# Patient Record
Sex: MALE | Race: BLACK OR AFRICAN AMERICAN
[De-identification: names, ages, dates, MRNs, and addresses within clinical notes are randomized per-mention and may not be internally consistent; named-entity substitution may affect disease eponyms.]

---

## 2017-08-31 ENCOUNTER — HOSPITAL ENCOUNTER (OUTPATIENT)
Dept: HOSPITAL 53 - M LAB REF | Age: 57
End: 2017-08-31
Attending: FAMILY MEDICINE
Payer: COMMERCIAL

## 2017-08-31 DIAGNOSIS — E07.9: Primary | ICD-10-CM

## 2018-07-26 ENCOUNTER — HOSPITAL ENCOUNTER (OUTPATIENT)
Dept: HOSPITAL 53 - M LAB REF | Age: 58
End: 2018-07-26
Attending: FAMILY MEDICINE
Payer: COMMERCIAL

## 2018-07-26 DIAGNOSIS — E07.9: Primary | ICD-10-CM

## 2018-07-26 LAB — FREE T3: 3.1 PG/ML (ref 2.2–4)

## 2018-07-26 PROCEDURE — 84481 FREE ASSAY (FT-3): CPT

## 2018-09-09 ENCOUNTER — HOSPITAL ENCOUNTER (OUTPATIENT)
Dept: HOSPITAL 53 - M LAB | Age: 58
End: 2018-09-09
Attending: INTERNAL MEDICINE
Payer: COMMERCIAL

## 2018-09-09 DIAGNOSIS — E05.90: Primary | ICD-10-CM

## 2018-09-09 LAB
FREE T4: 0.69 NG/DL (ref 0.76–1.46)
THYROID STIMULATING HORMONE: 0.57 UIU/ML (ref 0.36–3.74)

## 2018-09-10 LAB — THYROGLOBULIN ANTIBODY: 23.2 U/ML (ref ?–60)

## 2018-09-12 LAB
THYROID STIMULATING IMMUNOGLOB: 1.47 IU/L (ref 0–0.55)
TSH RECEPTOR ASSAY: 1.98 IU/L (ref 0–1.75)

## 2018-10-16 ENCOUNTER — HOSPITAL ENCOUNTER (OUTPATIENT)
Dept: HOSPITAL 53 - M LAB REF | Age: 58
End: 2018-10-16
Attending: INTERNAL MEDICINE
Payer: COMMERCIAL

## 2018-10-16 DIAGNOSIS — Z79.4: ICD-10-CM

## 2018-10-16 DIAGNOSIS — E11.9: Primary | ICD-10-CM

## 2018-10-16 DIAGNOSIS — E05.90: ICD-10-CM

## 2018-10-16 LAB
EST. AVERAGE GLUCOSE BLD GHB EST-MCNC: 123 MG/DL (ref 60–110)
FREE T3: 2.9 PG/ML (ref 2.2–4)
FREE T4: 0.91 NG/DL (ref 0.76–1.46)
THYROID STIMULATING HORMONE: 0.24 UIU/ML (ref 0.36–3.74)

## 2019-01-24 ENCOUNTER — HOSPITAL ENCOUNTER (OUTPATIENT)
Dept: HOSPITAL 53 - M LAB | Age: 59
End: 2019-01-24
Attending: INTERNAL MEDICINE
Payer: COMMERCIAL

## 2019-01-24 DIAGNOSIS — E11.9: Primary | ICD-10-CM

## 2019-01-24 DIAGNOSIS — Z79.4: ICD-10-CM

## 2019-01-24 LAB
BUN SERPL-MCNC: 18 MG/DL (ref 7–18)
CALCIUM SERPL-MCNC: 9.5 MG/DL (ref 8.5–10.1)
CHLORIDE SERPL-SCNC: 100 MEQ/L (ref 98–107)
CHOLEST SERPL-MCNC: 233 MG/DL (ref ?–200)
CHOLEST/HDLC SERPL: 3.43 {RATIO} (ref ?–5)
CO2 SERPL-SCNC: 27 MEQ/L (ref 21–32)
CREAT SERPL-MCNC: 1.24 MG/DL (ref 0.7–1.3)
EST. AVERAGE GLUCOSE BLD GHB EST-MCNC: 131 MG/DL (ref 60–110)
GFR SERPL CREATININE-BSD FRML MDRD: > 60 ML/MIN/{1.73_M2} (ref 56–?)
GLUCOSE SERPL-MCNC: 103 MG/DL (ref 70–100)
HCT VFR BLD AUTO: 48.3 % (ref 42–52)
HDLC SERPL-MCNC: 68 MG/DL (ref 40–?)
HGB BLD-MCNC: 16.3 G/DL (ref 13.5–17.5)
LDLC SERPL CALC-MCNC: 139 MG/DL (ref ?–100)
MCH RBC QN AUTO: 29.2 PG (ref 27–33)
MCHC RBC AUTO-ENTMCNC: 33.7 G/DL (ref 32–36.5)
MCV RBC AUTO: 86.4 FL (ref 80–96)
NONHDLC SERPL-MCNC: 165 MG/DL
PLATELET # BLD AUTO: 270 10^3/UL (ref 150–450)
POTASSIUM SERPL-SCNC: 3.6 MEQ/L (ref 3.5–5.1)
RBC # BLD AUTO: 5.59 10^6/UL (ref 4.3–6.1)
SODIUM SERPL-SCNC: 137 MEQ/L (ref 136–145)
T4 FREE SERPL-MCNC: 0.98 NG/DL (ref 0.76–1.46)
TRIGL SERPL-MCNC: 131 MG/DL (ref ?–150)
TSH SERPL DL<=0.005 MIU/L-ACNC: 0.67 UIU/ML (ref 0.36–3.74)
WBC # BLD AUTO: 6.5 10^3/UL (ref 4–10)

## 2019-03-23 ENCOUNTER — HOSPITAL ENCOUNTER (EMERGENCY)
Dept: HOSPITAL 53 - M ED | Age: 59
LOS: 1 days | Discharge: HOME | End: 2019-03-24
Payer: COMMERCIAL

## 2019-03-23 VITALS — HEIGHT: 70 IN | BODY MASS INDEX: 31.13 KG/M2 | WEIGHT: 217.46 LBS

## 2019-03-23 DIAGNOSIS — E11.9: ICD-10-CM

## 2019-03-23 DIAGNOSIS — Z79.899: ICD-10-CM

## 2019-03-23 DIAGNOSIS — K92.2: Primary | ICD-10-CM

## 2019-03-23 DIAGNOSIS — K57.92: ICD-10-CM

## 2019-03-23 DIAGNOSIS — Z92.3: ICD-10-CM

## 2019-03-23 DIAGNOSIS — Z79.82: ICD-10-CM

## 2019-03-23 DIAGNOSIS — Z79.4: ICD-10-CM

## 2019-03-23 DIAGNOSIS — Z85.46: ICD-10-CM

## 2019-03-23 DIAGNOSIS — I10: ICD-10-CM

## 2019-03-23 LAB
ALBUMIN SERPL BCG-MCNC: 3.7 GM/DL (ref 3.2–5.2)
ALT SERPL W P-5'-P-CCNC: 31 U/L (ref 12–78)
APTT BLD: 27.1 SECONDS (ref 25.4–37.6)
BASOPHILS # BLD AUTO: 0 10^3/UL (ref 0–0.2)
BASOPHILS NFR BLD AUTO: 0.2 % (ref 0–1)
BILIRUB CONJ SERPL-MCNC: 0.2 MG/DL (ref 0–0.2)
BILIRUB SERPL-MCNC: 0.6 MG/DL (ref 0.2–1)
BUN SERPL-MCNC: 14 MG/DL (ref 7–18)
CALCIUM SERPL-MCNC: 9.2 MG/DL (ref 8.5–10.1)
CHLORIDE SERPL-SCNC: 104 MEQ/L (ref 98–107)
CO2 SERPL-SCNC: 25 MEQ/L (ref 21–32)
CREAT SERPL-MCNC: 1.12 MG/DL (ref 0.7–1.3)
EOSINOPHIL # BLD AUTO: 0 10^3/UL (ref 0–0.5)
EOSINOPHIL NFR BLD AUTO: 0.5 % (ref 0–3)
GFR SERPL CREATININE-BSD FRML MDRD: > 60 ML/MIN/{1.73_M2} (ref 56–?)
GLUCOSE SERPL-MCNC: 117 MG/DL (ref 70–100)
HCT VFR BLD AUTO: 43.1 % (ref 42–52)
HGB BLD-MCNC: 14.4 G/DL (ref 13.5–17.5)
INR PPP: 1.08
LIPASE SERPL-CCNC: 80 U/L (ref 73–393)
LYMPHOCYTES # BLD AUTO: 1.3 10^3/UL (ref 1.5–4.5)
LYMPHOCYTES NFR BLD AUTO: 14.3 % (ref 24–44)
MCH RBC QN AUTO: 28.5 PG (ref 27–33)
MCHC RBC AUTO-ENTMCNC: 33.4 G/DL (ref 32–36.5)
MCV RBC AUTO: 85.3 FL (ref 80–96)
MONOCYTES # BLD AUTO: 0.9 10^3/UL (ref 0–0.8)
MONOCYTES NFR BLD AUTO: 10.5 % (ref 0–5)
NEUTROPHILS # BLD AUTO: 6.5 10^3/UL (ref 1.8–7.7)
NEUTROPHILS NFR BLD AUTO: 73.6 % (ref 36–66)
PLATELET # BLD AUTO: 275 10^3/UL (ref 150–450)
POTASSIUM SERPL-SCNC: 4 MEQ/L (ref 3.5–5.1)
PROT SERPL-MCNC: 7.5 GM/DL (ref 6.4–8.2)
PROTHROMBIN TIME: 14.1 SECONDS (ref 12.1–14.4)
RBC # BLD AUTO: 5.05 10^6/UL (ref 4.3–6.1)
SODIUM SERPL-SCNC: 138 MEQ/L (ref 136–145)
WBC # BLD AUTO: 8.9 10^3/UL (ref 4–10)

## 2019-03-23 PROCEDURE — 96361 HYDRATE IV INFUSION ADD-ON: CPT

## 2019-03-23 PROCEDURE — 83605 ASSAY OF LACTIC ACID: CPT

## 2019-03-23 PROCEDURE — 81001 URINALYSIS AUTO W/SCOPE: CPT

## 2019-03-23 PROCEDURE — 93041 RHYTHM ECG TRACING: CPT

## 2019-03-23 PROCEDURE — 80048 BASIC METABOLIC PNL TOTAL CA: CPT

## 2019-03-23 PROCEDURE — 74177 CT ABD & PELVIS W/CONTRAST: CPT

## 2019-03-23 PROCEDURE — 85025 COMPLETE CBC W/AUTO DIFF WBC: CPT

## 2019-03-23 PROCEDURE — 87040 BLOOD CULTURE FOR BACTERIA: CPT

## 2019-03-23 PROCEDURE — 80076 HEPATIC FUNCTION PANEL: CPT

## 2019-03-23 PROCEDURE — 85730 THROMBOPLASTIN TIME PARTIAL: CPT

## 2019-03-23 PROCEDURE — 85610 PROTHROMBIN TIME: CPT

## 2019-03-23 PROCEDURE — 99285 EMERGENCY DEPT VISIT HI MDM: CPT

## 2019-03-23 PROCEDURE — 96360 HYDRATION IV INFUSION INIT: CPT

## 2019-03-23 PROCEDURE — 83690 ASSAY OF LIPASE: CPT

## 2019-03-23 NOTE — REPVR
EXAM: 

 CT Abdomen and Pelvis With Contrast 



EXAM DATE/TIME: 

 3/23/2019 11:01 PM 



CLINICAL HISTORY: 

 58 years old, male; Pain; Abdominal pain; Localized; Left lower quadrant 

(llq); Additional info: Llq pain, lower gi bleed 



TECHNIQUE: 

 Imaging protocol: Axial computed tomography images of the abdomen and pelvis 

with intravenous contrast. 

  Coronal and sagittal reformatted images were created and reviewed. 

 Radiation optimization: All CT scans at this facility use at least one of 

these dose optimization techniques: automated exposure control; mA and/or kV 

adjustment per patient size (includes targeted exams where dose is matched to 

clinical indication); or iterative reconstruction. 

 Contrast material: ISOVUE 370 

 Contrast volume: 100 ml 

 Contrast route: IV 



COMPARISON: 

 CT ABD PELVIS WITH CONTRAST 2/16/2016 1:19 AM 



FINDINGS: 

 Lower thorax: No acute findings. 



ABDOMEN: 

 Liver:  There is a diffuse decrease in hepatic parenchymal density, consistent 

with fatty infiltration. 

 Gallbladder and bile ducts: Normal. No calcified stones. No ductal dilation. 

 Pancreas: Normal. No ductal dilation. 

 Spleen: Normal. No splenomegaly. 

 Adrenals: Normal. No mass. 

 Kidneys and ureters: Normal. No hydronephrosis. 

 Stomach and bowel:  Mild diverticulosis is present in the distal colon. No 

diverticulitis. 

 Appendix: No evidence of appendicitis. 



PELVIS: 

 Bladder:  Mild thickening of the bladder wall likely related to changes of 

chronic bladder outlet obstruction. Clinical correlation to exclude cystitis 

suggested. 

 Reproductive: Unremarkable as visualized. 



ABDOMEN and PELVIS: 

 Intraperitoneal space: Normal. No free air. No significant fluid collection. 

 Bones/joints:  Mild central spinal stenosis at L4-5. Bulging annulus at L5-S1. 

 Soft tissues:  Small periumbilical hernia. Small umbilical hernia. 

 Vasculature:  The aorta demonstrates mild atherosclerotic calcification. 

 Lymph nodes: Normal. No enlarged lymph nodes. 



IMPRESSION: 

1. There is a diffuse decrease in hepatic parenchymal density, consistent with 

fatty infiltration. 

2. Mild thickening of the bladder wall likely related to changes of chronic 

bladder outlet obstruction. Clinical correlation to exclude cystitis suggested. 

3. Mild diverticulosis is present in the distal colon. No diverticulitis. 



Electronically signed by: Parth Miguel On 03/23/2019  23:53:03 PM

## 2019-03-24 VITALS — SYSTOLIC BLOOD PRESSURE: 173 MMHG | DIASTOLIC BLOOD PRESSURE: 83 MMHG

## 2019-04-16 ENCOUNTER — HOSPITAL ENCOUNTER (OUTPATIENT)
Dept: HOSPITAL 53 - M LAB | Age: 59
End: 2019-04-16
Attending: INTERNAL MEDICINE
Payer: COMMERCIAL

## 2019-04-16 DIAGNOSIS — E11.9: Primary | ICD-10-CM

## 2019-04-16 DIAGNOSIS — Z79.4: ICD-10-CM

## 2019-04-16 LAB
BUN SERPL-MCNC: 15 MG/DL (ref 7–18)
CALCIUM SERPL-MCNC: 9.2 MG/DL (ref 8.5–10.1)
CHLORIDE SERPL-SCNC: 102 MEQ/L (ref 98–107)
CHOLEST SERPL-MCNC: 234 MG/DL (ref ?–200)
CHOLEST/HDLC SERPL: 4.11 {RATIO} (ref ?–5)
CO2 SERPL-SCNC: 28 MEQ/L (ref 21–32)
CREAT SERPL-MCNC: 1.2 MG/DL (ref 0.7–1.3)
EST. AVERAGE GLUCOSE BLD GHB EST-MCNC: 166 MG/DL (ref 60–110)
GFR SERPL CREATININE-BSD FRML MDRD: > 60 ML/MIN/{1.73_M2} (ref 56–?)
GLUCOSE SERPL-MCNC: 126 MG/DL (ref 70–100)
HCT VFR BLD AUTO: 44.2 % (ref 42–52)
HDLC SERPL-MCNC: 57 MG/DL (ref 40–?)
HGB BLD-MCNC: 14.8 G/DL (ref 13.5–17.5)
LDLC SERPL CALC-MCNC: 146 MG/DL (ref ?–100)
MCH RBC QN AUTO: 28.2 PG (ref 27–33)
MCHC RBC AUTO-ENTMCNC: 33.5 G/DL (ref 32–36.5)
MCV RBC AUTO: 84.2 FL (ref 80–96)
NONHDLC SERPL-MCNC: 177 MG/DL
PLATELET # BLD AUTO: 318 10^3/UL (ref 150–450)
POTASSIUM SERPL-SCNC: 3.4 MEQ/L (ref 3.5–5.1)
RBC # BLD AUTO: 5.25 10^6/UL (ref 4.3–6.1)
SODIUM SERPL-SCNC: 137 MEQ/L (ref 136–145)
T4 FREE SERPL-MCNC: 1.23 NG/DL (ref 0.76–1.46)
TRIGL SERPL-MCNC: 155 MG/DL (ref ?–150)
TSH SERPL DL<=0.005 MIU/L-ACNC: 0.16 UIU/ML (ref 0.36–3.74)
WBC # BLD AUTO: 6.7 10^3/UL (ref 4–10)

## 2019-09-15 ENCOUNTER — HOSPITAL ENCOUNTER (OUTPATIENT)
Dept: HOSPITAL 53 - M LAB | Age: 59
End: 2019-09-15
Attending: PHYSICIAN ASSISTANT
Payer: COMMERCIAL

## 2019-09-15 DIAGNOSIS — E11.9: Primary | ICD-10-CM

## 2019-09-15 LAB
BUN SERPL-MCNC: 13 MG/DL (ref 7–18)
CALCIUM SERPL-MCNC: 9.5 MG/DL (ref 8.5–10.1)
CHLORIDE SERPL-SCNC: 100 MEQ/L (ref 98–107)
CHOLEST SERPL-MCNC: 236 MG/DL (ref ?–200)
CHOLEST/HDLC SERPL: 3.69 {RATIO} (ref ?–5)
CO2 SERPL-SCNC: 26 MEQ/L (ref 21–32)
CREAT SERPL-MCNC: 1.38 MG/DL (ref 0.7–1.3)
CREAT UR-MCNC: 380 MG/DL
EST. AVERAGE GLUCOSE BLD GHB EST-MCNC: 148 MG/DL (ref 60–110)
GFR SERPL CREATININE-BSD FRML MDRD: > 60 ML/MIN/{1.73_M2} (ref 56–?)
GLUCOSE SERPL-MCNC: 132 MG/DL (ref 70–100)
HDLC SERPL-MCNC: 64 MG/DL (ref 40–?)
LDLC SERPL CALC-MCNC: 133 MG/DL (ref ?–100)
MICROALBUMIN UR-MCNC: 228 MG/L
MICROALBUMIN/CREAT UR: 60 MCG/MG (ref 0–30)
NONHDLC SERPL-MCNC: 172 MG/DL
POTASSIUM SERPL-SCNC: 3.8 MEQ/L (ref 3.5–5.1)
SODIUM SERPL-SCNC: 137 MEQ/L (ref 136–145)
T3FREE SERPL-MCNC: 2.6 PG/ML (ref 2.2–4)
T4 FREE SERPL-MCNC: 0.92 NG/DL (ref 0.76–1.46)
TRIGL SERPL-MCNC: 197 MG/DL (ref ?–150)
TSH SERPL DL<=0.005 MIU/L-ACNC: 1.17 UIU/ML (ref 0.36–3.74)

## 2019-12-16 ENCOUNTER — HOSPITAL ENCOUNTER (OUTPATIENT)
Dept: HOSPITAL 53 - M LAB | Age: 59
End: 2019-12-16
Attending: PHYSICIAN ASSISTANT
Payer: COMMERCIAL

## 2019-12-16 DIAGNOSIS — E11.9: ICD-10-CM

## 2019-12-16 DIAGNOSIS — E05.90: Primary | ICD-10-CM

## 2019-12-16 DIAGNOSIS — Z79.4: ICD-10-CM

## 2019-12-16 LAB
BUN SERPL-MCNC: 10 MG/DL (ref 7–18)
CALCIUM SERPL-MCNC: 9.3 MG/DL (ref 8.5–10.1)
CHLORIDE SERPL-SCNC: 106 MEQ/L (ref 98–107)
CHOLEST SERPL-MCNC: 155 MG/DL (ref ?–200)
CHOLEST/HDLC SERPL: 1.82 {RATIO} (ref ?–5)
CO2 SERPL-SCNC: 23 MEQ/L (ref 21–32)
CREAT SERPL-MCNC: 1.04 MG/DL (ref 0.7–1.3)
CREAT UR-MCNC: 283 MG/DL
EST. AVERAGE GLUCOSE BLD GHB EST-MCNC: 120 MG/DL (ref 60–110)
GFR SERPL CREATININE-BSD FRML MDRD: > 60 ML/MIN/{1.73_M2} (ref 56–?)
GLUCOSE SERPL-MCNC: 107 MG/DL (ref 70–100)
HDLC SERPL-MCNC: 85 MG/DL (ref 40–?)
LDLC SERPL CALC-MCNC: 52 MG/DL (ref ?–100)
MICROALBUMIN UR-MCNC: 309 MG/L
MICROALBUMIN/CREAT UR: 109.1 MCG/MG (ref 0–30)
NONHDLC SERPL-MCNC: 70 MG/DL
POTASSIUM SERPL-SCNC: 4 MEQ/L (ref 3.5–5.1)
SODIUM SERPL-SCNC: 138 MEQ/L (ref 136–145)
T3FREE SERPL-MCNC: 2.8 PG/ML (ref 2.2–4)
T4 FREE SERPL-MCNC: 1.11 NG/DL (ref 0.76–1.46)
TRIGL SERPL-MCNC: 90 MG/DL (ref ?–150)
TSH SERPL DL<=0.005 MIU/L-ACNC: 0.08 UIU/ML (ref 0.36–3.74)

## 2020-04-21 ENCOUNTER — HOSPITAL ENCOUNTER (OUTPATIENT)
Dept: HOSPITAL 53 - M LAB | Age: 60
End: 2020-04-21
Attending: PHYSICIAN ASSISTANT
Payer: COMMERCIAL

## 2020-04-21 DIAGNOSIS — E11.9: Primary | ICD-10-CM

## 2020-04-21 DIAGNOSIS — E05.90: ICD-10-CM

## 2020-04-21 DIAGNOSIS — Z79.4: ICD-10-CM

## 2020-04-21 LAB
BUN SERPL-MCNC: 9 MG/DL (ref 7–18)
CALCIUM SERPL-MCNC: 9.2 MG/DL (ref 8.5–10.1)
CHLORIDE SERPL-SCNC: 104 MEQ/L (ref 98–107)
CO2 SERPL-SCNC: 25 MEQ/L (ref 21–32)
CREAT SERPL-MCNC: 1.2 MG/DL (ref 0.7–1.3)
CREAT UR-MCNC: 499 MG/DL
GFR SERPL CREATININE-BSD FRML MDRD: > 60 ML/MIN/{1.73_M2} (ref 56–?)
GLUCOSE SERPL-MCNC: 141 MG/DL (ref 70–100)
MICROALBUMIN UR-MCNC: 451 MG/L
MICROALBUMIN/CREAT UR: 90.3 MCG/MG (ref 0–30)
POTASSIUM SERPL-SCNC: 4.2 MEQ/L (ref 3.5–5.1)
SODIUM SERPL-SCNC: 135 MEQ/L (ref 136–145)
T3FREE SERPL-MCNC: 3.2 PG/ML (ref 2.2–4)
T4 FREE SERPL-MCNC: 1.07 NG/DL (ref 0.76–1.46)
TSH SERPL DL<=0.005 MIU/L-ACNC: 1 UIU/ML (ref 0.36–3.74)

## 2020-04-22 ENCOUNTER — HOSPITAL ENCOUNTER (OUTPATIENT)
Dept: HOSPITAL 53 - M LAB | Age: 60
End: 2020-04-22
Attending: INTERNAL MEDICINE
Payer: COMMERCIAL

## 2020-04-22 DIAGNOSIS — E11.9: Primary | ICD-10-CM

## 2020-04-22 LAB — EST. AVERAGE GLUCOSE BLD GHB EST-MCNC: 151 MG/DL (ref 60–110)

## 2020-05-25 ENCOUNTER — HOSPITAL ENCOUNTER (OUTPATIENT)
Dept: HOSPITAL 53 - M LAB | Age: 60
End: 2020-05-25
Attending: RADIOLOGY
Payer: COMMERCIAL

## 2020-05-25 DIAGNOSIS — C61: Primary | ICD-10-CM

## 2020-07-29 ENCOUNTER — HOSPITAL ENCOUNTER (OUTPATIENT)
Dept: HOSPITAL 53 - M LAB | Age: 60
End: 2020-07-29
Attending: INTERNAL MEDICINE
Payer: COMMERCIAL

## 2020-07-29 DIAGNOSIS — E11.9: Primary | ICD-10-CM

## 2020-07-29 DIAGNOSIS — E05.90: ICD-10-CM

## 2020-07-29 DIAGNOSIS — Z79.4: ICD-10-CM

## 2020-09-06 LAB
BUN SERPL-MCNC: 12 MG/DL (ref 7–18)
CALCIUM SERPL-MCNC: 9.2 MG/DL (ref 8.5–10.1)
CHLORIDE SERPL-SCNC: 100 MEQ/L (ref 98–107)
CHOLEST SERPL-MCNC: 171 MG/DL (ref ?–200)
CHOLEST/HDLC SERPL: 2.34 {RATIO} (ref ?–5)
CO2 SERPL-SCNC: 27 MEQ/L (ref 21–32)
CREAT SERPL-MCNC: 1.24 MG/DL (ref 0.7–1.3)
CREAT UR-MCNC: (no result) MG/DL
EST. AVERAGE GLUCOSE BLD GHB EST-MCNC: 143 MG/DL (ref 60–110)
GFR SERPL CREATININE-BSD FRML MDRD: > 60 ML/MIN/{1.73_M2} (ref 56–?)
GLUCOSE SERPL-MCNC: 129 MG/DL (ref 70–100)
HDLC SERPL-MCNC: 73 MG/DL (ref 40–?)
LDLC SERPL CALC-MCNC: 70 MG/DL (ref ?–100)
MICROALBUMIN UR-MCNC: 86.5 MG/L
MICROALBUMIN/CREAT UR: (no result) MCG/MG (ref 0–30)
NONHDLC SERPL-MCNC: 98 MG/DL
POTASSIUM SERPL-SCNC: 4 MEQ/L (ref 3.5–5.1)
SODIUM SERPL-SCNC: 135 MEQ/L (ref 136–145)
T4 FREE SERPL-MCNC: 1.01 NG/DL (ref 0.76–1.46)
TRIGL SERPL-MCNC: 139 MG/DL (ref ?–150)
TSH SERPL DL<=0.005 MIU/L-ACNC: 0.86 UIU/ML (ref 0.36–3.74)

## 2021-01-29 ENCOUNTER — HOSPITAL ENCOUNTER (OUTPATIENT)
Dept: HOSPITAL 53 - M LAB | Age: 61
End: 2021-01-29
Attending: INTERNAL MEDICINE
Payer: COMMERCIAL

## 2021-01-29 DIAGNOSIS — Z79.4: ICD-10-CM

## 2021-01-29 DIAGNOSIS — E11.9: Primary | ICD-10-CM

## 2021-01-29 LAB
CHOLEST SERPL-MCNC: 148 MG/DL (ref ?–200)
CHOLEST/HDLC SERPL: 2.06 {RATIO} (ref ?–5)
CREAT UR-MCNC: 132 MG/DL
EST. AVERAGE GLUCOSE BLD GHB EST-MCNC: 140 MG/DL (ref 60–110)
HDLC SERPL-MCNC: 72 MG/DL (ref 40–?)
LDLC SERPL CALC-MCNC: 53 MG/DL (ref ?–100)
MICROALBUMIN UR-MCNC: 20.1 MG/L
MICROALBUMIN/CREAT UR: 15.2 MCG/MG (ref 0–30)
NONHDLC SERPL-MCNC: 76 MG/DL
T4 FREE SERPL-MCNC: 1.03 NG/DL (ref 0.76–1.46)
TRIGL SERPL-MCNC: 117 MG/DL (ref ?–150)
TSH SERPL DL<=0.005 MIU/L-ACNC: 1.41 UIU/ML (ref 0.36–3.74)

## 2021-03-31 ENCOUNTER — HOSPITAL ENCOUNTER (OUTPATIENT)
Dept: HOSPITAL 53 - M LAB | Age: 61
End: 2021-03-31
Attending: PHYSICIAN ASSISTANT
Payer: COMMERCIAL

## 2021-03-31 DIAGNOSIS — E78.00: ICD-10-CM

## 2021-03-31 DIAGNOSIS — I10: ICD-10-CM

## 2021-03-31 DIAGNOSIS — Z79.4: ICD-10-CM

## 2021-03-31 DIAGNOSIS — C61: ICD-10-CM

## 2021-03-31 DIAGNOSIS — E05.90: ICD-10-CM

## 2021-03-31 DIAGNOSIS — E11.9: Primary | ICD-10-CM

## 2021-03-31 LAB
BUN SERPL-MCNC: 10 MG/DL (ref 7–18)
CALCIUM SERPL-MCNC: 9.2 MG/DL (ref 8.8–10.2)
CHLORIDE SERPL-SCNC: 101 MEQ/L (ref 98–107)
CO2 SERPL-SCNC: 27 MEQ/L (ref 21–32)
CREAT SERPL-MCNC: 1.15 MG/DL (ref 0.7–1.3)
CREAT UR-MCNC: 110 MG/DL
GFR SERPL CREATININE-BSD FRML MDRD: > 60 ML/MIN/{1.73_M2} (ref 49–?)
GLUCOSE SERPL-MCNC: 132 MG/DL (ref 70–100)
MICROALBUMIN UR-MCNC: 26 MG/L
MICROALBUMIN/CREAT UR: 23.6 MCG/MG (ref 0–30)
POTASSIUM SERPL-SCNC: 3.8 MEQ/L (ref 3.5–5.1)
SODIUM SERPL-SCNC: 135 MEQ/L (ref 136–145)
T3FREE SERPL-MCNC: 2.6 PG/ML (ref 2.2–4)
T4 FREE SERPL-MCNC: 0.97 NG/DL (ref 0.76–1.46)
TSH SERPL DL<=0.005 MIU/L-ACNC: 1.74 UIU/ML (ref 0.36–3.74)

## 2021-03-31 PROCEDURE — 82043 UR ALBUMIN QUANTITATIVE: CPT

## 2021-03-31 PROCEDURE — 84481 FREE ASSAY (FT-3): CPT

## 2021-03-31 PROCEDURE — 36415 COLL VENOUS BLD VENIPUNCTURE: CPT

## 2021-03-31 PROCEDURE — 84439 ASSAY OF FREE THYROXINE: CPT

## 2021-03-31 PROCEDURE — 84443 ASSAY THYROID STIM HORMONE: CPT

## 2021-03-31 PROCEDURE — 80048 BASIC METABOLIC PNL TOTAL CA: CPT

## 2021-06-30 ENCOUNTER — HOSPITAL ENCOUNTER (OUTPATIENT)
Dept: HOSPITAL 53 - M LAB REF | Age: 61
End: 2021-06-30
Attending: OPHTHALMOLOGY
Payer: COMMERCIAL

## 2021-06-30 DIAGNOSIS — D23.10: Primary | ICD-10-CM

## 2021-07-12 ENCOUNTER — HOSPITAL ENCOUNTER (OUTPATIENT)
Dept: HOSPITAL 53 - M LAB | Age: 61
End: 2021-07-12
Attending: NURSE PRACTITIONER

## 2021-07-12 DIAGNOSIS — Z00.00: Primary | ICD-10-CM

## 2021-10-05 ENCOUNTER — HOSPITAL ENCOUNTER (OUTPATIENT)
Dept: HOSPITAL 53 - M LAB | Age: 61
End: 2021-10-05
Attending: PHYSICIAN ASSISTANT
Payer: COMMERCIAL

## 2021-10-05 DIAGNOSIS — Z79.4: ICD-10-CM

## 2021-10-05 DIAGNOSIS — E11.9: Primary | ICD-10-CM

## 2021-10-05 DIAGNOSIS — E05.90: ICD-10-CM

## 2021-10-05 LAB
CHOLEST SERPL-MCNC: 166 MG/DL (ref ?–200)
CHOLEST/HDLC SERPL: 2.4 {RATIO} (ref ?–5)
EST. AVERAGE GLUCOSE BLD GHB EST-MCNC: 146 MG/DL (ref 60–110)
HDLC SERPL-MCNC: 69 MG/DL (ref 40–?)
LDLC SERPL CALC-MCNC: 76 MG/DL (ref ?–100)
NONHDLC SERPL-MCNC: 97 MG/DL
T4 FREE SERPL-MCNC: 1.07 NG/DL (ref 0.76–1.46)
TRIGL SERPL-MCNC: 107 MG/DL (ref ?–150)
TSH SERPL DL<=0.005 MIU/L-ACNC: 1.3 UIU/ML (ref 0.36–3.74)

## 2021-11-06 ENCOUNTER — HOSPITAL ENCOUNTER (OUTPATIENT)
Dept: HOSPITAL 53 - M LABSMTC | Age: 61
End: 2021-11-06
Attending: ANESTHESIOLOGY
Payer: COMMERCIAL

## 2021-11-06 DIAGNOSIS — Z01.812: Primary | ICD-10-CM

## 2021-11-06 DIAGNOSIS — Z20.822: ICD-10-CM

## 2021-11-11 ENCOUNTER — HOSPITAL ENCOUNTER (OUTPATIENT)
Dept: HOSPITAL 53 - M OPP | Age: 61
Discharge: HOME | End: 2021-11-11
Attending: SURGERY
Payer: COMMERCIAL

## 2021-11-11 VITALS — HEIGHT: 70 IN | WEIGHT: 208.8 LBS | BODY MASS INDEX: 29.89 KG/M2

## 2021-11-11 VITALS — SYSTOLIC BLOOD PRESSURE: 177 MMHG | DIASTOLIC BLOOD PRESSURE: 100 MMHG

## 2021-11-11 DIAGNOSIS — E03.9: ICD-10-CM

## 2021-11-11 DIAGNOSIS — E11.9: ICD-10-CM

## 2021-11-11 DIAGNOSIS — D12.6: Primary | ICD-10-CM

## 2021-11-11 DIAGNOSIS — I10: ICD-10-CM

## 2021-11-11 DIAGNOSIS — Z85.46: ICD-10-CM

## 2021-11-11 DIAGNOSIS — Z86.010: ICD-10-CM

## 2021-11-11 DIAGNOSIS — K57.30: ICD-10-CM

## 2021-11-11 DIAGNOSIS — Z79.899: ICD-10-CM

## 2021-11-11 NOTE — CCD
Continuity of Care Document (CCD)

                             Created on: 2021



Tonny Prieto

External Reference #: [id number]

: 1960

Sex: Male



Demographics





                          Address                   [address] [place] [address] 

[place]  [address]

 

                          Home Phone                [phone]

 

                          Preferred Language        Unknown

 

                          Marital Status            Unknown

 

                          Adventist Affiliation     Unknown

 

                          Race                      Black or 

 

                          Ethnic Group              Not  or 





Author





                          Author                    Tonny SYLVESTER PA

 

                          Organization              Unknown

 

                          Address                   [address], [address]

[place]  [address]



 

                          Phone                     [phone]







Care Team Providers





                    Care Team Member Name Role                Phone

 

                    David Ness M.D.   AUTM                [phone]







Problems





                    Active Problems     Provider            Date

 

                    Essential hypertension Leo J. Gosselin JR MD Onset: 20

20







Social History





                Type            Date            Description     Comments

 

                Birth Sex                       Unknown          

 

                ETOH Use                        2 A Day          

 

                Tobacco Use     Start: Unknown  Denies Smoking   

 

                Recreational Drug Use                 Denies Drug Use  







Allergies, Adverse Reactions, Alerts





                                        Description

 

                                        No Known Drug Allergies







Medications





           Active Medications SIG        Qnty       Indications Ordering Provide

r Date

 

           Telmisartan                     80mg Tablets                   1 qd  

                           Unknown    



 

                          Methimazole                     10mg Tablets          

         1 tab on Monday, 

Wednesday & Friday                                 Unknown         

 

             Amlodipine Besylate                     10mg Tablets               

    1 qd                                   

Unknown                                 

 

             Hydrochlorothiazide                     25mg Tablets               

    1 qd                                   

Unknown                                 

 

                                        Metoprolol Succinate ER                 

    25mg Tablets ER 24HR                

             1 qd                                   Unknown      

 

                Atorvastatin Calcium                     10mg Tablets           

        1 qd                             

                          Unknown                   

 

                                        Ozempic (0.25 Or 0.5 MG/Dose)           

          2mg/1.5ML Solution Pen-Inject 

                 0.5 MG once a week                           Unknown      







Immunizations





                                        Description

 

                                        No Information Available







Vital Signs





                Date            Vital           Result          Comment

 

                2021 10:03am BP Systolic     130 mmHg         

 

                    BP Diastolic        70 mmHg              

 

                    Height              70 inches           5'10"

 

                    Weight              238.00 lb            

 

                    BMI (Body Mass Index) 34.1 kg/m2           

 

                    Ideal Body Weight   166 lb               

 

                    Weight              107.957 kg           

 

                    BSA (Body Surface Area) 2.25 m2              

 

                2020 10:25am BP Systolic     157 mmHg         

 

                    BP Diastolic        95 mmHg              

 

                    Height              70 inches           5'10"

 

                    Weight              205.25 lb            

 

                    BMI (Body Mass Index) 29.4 kg/m2           

 

                    Ideal Body Weight   166 lb               

 

                    Weight              93.101 kg            

 

                    BSA (Body Surface Area) 2.11 m2              







Results





                                        Description

 

                                        No Information Available







Procedures





                                        Description

 

                                        No Information Available







Medical Devices





                                        Description

 

                                        No Information Available







Encounters





                                        Description

 

                                        No Information Available







Assessments





                Date            Code            Description     Provider

 

                2021      Z86.010         Personal history of colonic poly

ps SIMIN Schultz







Plan of Treatment





No Information Available



Functional Status





                                        Description

 

                                        No Information Available







Mental Status





                                        Description

 

                                        No Information Available







Referrals





                                        Description

 

                                        No Information Available

## 2021-11-11 NOTE — CCD
Summarization Of Episode

                             Created on: 2021



ANNEMARIE BIRD ODGABE

External Reference #: [id number]

: 1960

Sex: Undifferentiated



Demographics





                          Address                   [address] [place]  [address]

 

                          Home Phone                [phone]

 

                          Preferred Language        English

 

                          Marital Status            Unknown

 

                          Yazidi Affiliation     Unknown

 

                          Race                      Unknown

 

                          Ethnic Group              Not  or 





Author





                          Author                    [facility]

 

                          Organization              [facility]

 

                          Address                   Unknown

 

                          Phone                     Unavailable







Support





                Name            Relationship    Address         Phone

 

                    INGRIS BIRD     Next Of Kin          [address]  [address]                    [phone]

 

                CLEMENT OKNIHARIKA HESTER Next Of Kin     Unknown         Unavailabl

e

 

                    LETITIA BIRD    Next Of Kin          [address]  [address]                    [phone]

 

                    [facility]*                Next Of Kin         [address] [place]  [address]                    [phone]

 

                    JENNIFER BIRD     Next Of Kin         [place]

[place]  [address]                      [phone]

 

                Conner  Letitia ECON            Unknown         Unavailable

 

                Ingris Bird ECON            Unknown         Unavailable







Care Team Providers





                    Care Team Member Name Role                Phone

 

                    CHIO Ness MD   Unavailable         Unavailable

 

                    CHIO Ness MD   Unavailable         Unavailable

 

                    CHIO Ness MD   Unavailable         Unavailable

 

                    CHIO Ness MD   Unavailable         Unavailable

 

                    CHIO Ness MD   Unavailable         Unavailable

 

                    CHIO Ness MD   Unavailable         Unavailable

 

                    CHIO Ness MD   Unavailable         Unavailable

 

                    CHIO Ness MD   Unavailable         Unavailable

 

                    CHIO Ness MD   Unavailable         Unavailable

 

                    CHIO Ness MD   Unavailable         Unavailable

 

                    CHIO Ness MD   Unavailable         Unavailable

 

                    CHIO Ness MD   Unavailable         Unavailable

 

                    CHIO Ness MD   Unavailable         Unavailable

 

                    CHIO Ness MD   Unavailable         Unavailable

 

                    CHIO Ness MD   Unavailable         Unavailable

 

                    CHIO Ness MD   Unavailable         Unavailable

 

                    CHIO Ness MD   Unavailable         Unavailable

 

                    CHIO Ness MD   Unavailable         Unavailable

 

                    CHIO Ness MD   Unavailable         Unavailable

 

                    CHIO Ness MD   Unavailable         Unavailable

 

                    CHIO Ness MD   Unavailable         Unavailable

 

                    CHIO Ness MD   Unavailable         Unavailable

 

                    CHIO Ness MD   Unavailable         Unavailable

 

                    CHIO Ness MD   Unavailable         Unavailable

 

                    CHIO Ness MD   Unavailable         Unavailable

 

                    CHIO Ness MD   Unavailable         Unavailable

 

                    CHIO Ness MD   Unavailable         Unavailable

 

                    CHIO Ness MD   Unavailable         Unavailable

 

                    CHIO Ness MD   Unavailable         Unavailable

 

                    CHIO Ness MD   Unavailable         Unavailable

 

                    CHIO Ness MD   Unavailable         Unavailable

 

                    CHIO Ness MD   Unavailable         Unavailable

 

                    Thi F David EGAN   Unavailable         Unavailable

 

                    Thi F David EGAN   Unavailable         Unavailable

 

                    White F David EGAN   Unavailable         Unavailable

 

                    Thi F David EGAN   Unavailable         Unavailable

 

                    Thi F David EGAN   Unavailable         Unavailable

 

                    Thi F David EGAN   Unavailable         Unavailable

 

                    Thi F David EGAN   Unavailable         Unavailable

 

                    White F David EGAN   Unavailable         Unavailable

 

                    White F David EGAN   Unavailable         Unavailable

 

                    Thi F David EGAN   Unavailable         Unavailable

 

                    Thi F David EGAN   Unavailable         Unavailable

 

                    Thi F David EGAN   Unavailable         Unavailable

 

                    Thi F David EGAN   Unavailable         Unavailable

 

                    Thi F David EGAN   Unavailable         Unavailable

 

                    Thi F David EGAN   Unavailable         Unavailable

 

                    White F David EGAN   Unavailable         Unavailable

 

                    Thi F David EGAN   Unavailable         Unavailable

 

                    Thi F David EGAN   Unavailable         Unavailable

 

                    Thi F David EGAN   Unavailable         Unavailable

 

                    Thi F David EGAN   Unavailable         Unavailable

 

                    Thi F David EGAN   Unavailable         Unavailable

 

                    Thi F David EGAN   Unavailable         Unavailable

 

                    Thi F David EGAN   Unavailable         Unavailable

 

                    Thi F David EGAN   Unavailable         Unavailable

 

                    Thi F David EGAN   Unavailable         Unavailable

 

                    Thi F David EGAN   Unavailable         Unavailable

 

                    Thi F David EGAN   Unavailable         Unavailable

 

                    Thi F David EGAN   Unavailable         Unavailable

 

                    Thi F David EGAN   Unavailable         Unavailable

 

                    Thi F David EGAN   Unavailable         Unavailable

 

                    Thi F David EGAN   Unavailable         Unavailable

 

                    hTi F David EGAN   Unavailable         Unavailable

 

                    Thi F David EGAN   Unavailable         Unavailable

 

                    Thi F David EGAN   Unavailable         Unavailable

 

                    CHIO Ness MD   Unavailable         Unavailable

 

                    CHIO Ness MD   Unavailable         Unavailable

 

                    CHIO Ness MD   Unavailable         Unavailable

 

                    CHIO Ness MD   Unavailable         Unavailable

 

                    CHIO Ness MD   Unavailable         Unavailable

 

                    CHIO Ness MD   Unavailable         Unavailable

 

                    CHIO Ness MD   Unavailable         Unavailable

 

                    CHIO Ness MD   Unavailable         Unavailable

 

                    CHIO Ness MD   Unavailable         Unavailable

 

                    CHIO Ness MD   Unavailable         Unavailable

 

                    CHIO Ness MD   Unavailable         Unavailable

 

                    TRI CARPENTER MD Unavailable         Unavailable

 

                    TRI CARPENTER MD Unavailable         Unavailable

 

                    TRI CARPENTER MD Unavailable         Unavailable

 

                    TRI CARPENTER MD Unavailable         Unavailable

 

                    TRI CARPENTER MD Unavailable         Unavailable

 

                    TRI CARPENTER MD Unavailable         Unavailable

 

                    TRI CARPENTER MD Unavailable         Unavailable

 

                    TRI CARPENTER MD Unavailable         Unavailable

 

                    TRI CARPENTER MD Unavailable         Unavailable

 

                    TRI CARPENTER MD Unavailable         Unavailable

 

                    TRI CARPENTER MD Unavailable         Unavailable

 

                    GENDZIJOE, TRI KHOURY MD Unavailable         Unavailable

 

                    GENDZIJOE, TRI KHOURY MD Unavailable         Unavailable

 

                    GENDZIJOE, TRI KHOURY MD Unavailable         Unavailable

 

                    GENDZIJOE, TRI KHOURY MD Unavailable         Unavailable

 

                    GENDZIJOE, TRI KHOURY MD Unavailable         Unavailable

 

                    GENDZIJOE, TRI KHOURY MD Unavailable         Unavailable

 

                    GENDZIJOE, TRI KHOURY MD Unavailable         Unavailable

 

                    GENDZIJOE, TRI KHOURY MD Unavailable         Unavailable

 

                    GENDZIJOE, TRI KHOURY MD Unavailable         Unavailable

 

                    GENDZIJOE, TRI KHOURY MD Unavailable         Unavailable

 

                    GENDZIJOE, TRI KHOURY MD Unavailable         Unavailable

 

                    GENDZIJOE, TRI KHOURY MD Unavailable         Unavailable

 

                    GENDZIJOE, TRI KHOURY MD Unavailable         Unavailable

 

                    GENDZIJOE, TRI KHOURY MD Unavailable         Unavailable

 

                    GENDZIJOE, TRI KHOURY MD Unavailable         Unavailable

 

                    GENDZIJOE, TRI KHOURY MD Unavailable         Unavailable

 

                    GENDZIJOE, TRI KHOURY MD Unavailable         Unavailable

 

                    GENDZIJOE, TRI KHOURY MD Unavailable         Unavailable

 

                    GENDZIJOE, TRI KHOURY MD Unavailable         Unavailable

 

                    GENDZIJOE, TRI KHOURY MD Unavailable         Unavailable

 

                    GENDZIJOE, TRI KHOURY MD Unavailable         Unavailable

 

                    GENDZIJOE, TRI KHOURY MD Unavailable         Unavailable

 

                    GENDZIJOE, TRI KHOURY MD Unavailable         Unavailable

 

                    GENDZIJOE, TRI KHOURY MD Unavailable         Unavailable

 

                    GENSUMMER, TRI KHOURY MD Unavailable         Unavailable

 

                    GENDZITRI DIAZ MD Unavailable         Unavailable

 

                    GENDZIJOE, TRI KHOURY MD Unavailable         Unavailable

 

                    GENDTRI SPENCER MD Unavailable         Unavailable

 

                    GENDZITRI DIAZ MD Unavailable         Unavailable

 

                    GENDTRI SPENCER MD Unavailable         Unavailable

 

                    GENDZITRI DIAZ MD Unavailable         Unavailable

 

                    GENDTRI SPENCER MD Unavailable         Unavailable

 

                    GENDZITRI DIAZ MD Unavailable         Unavailable

 

                    GENDZIJOE, TRI KHOURY MD Unavailable         Unavailable

 

                    GENDZITRI DIAZ MD Unavailable         Unavailable

 

                    GENDZITRI DIAZ MD Unavailable         Unavailable

 

                    GENDZITRI DIAZ MD Unavailable         Unavailable

 

                    GENDZITRI DIAZ MD Unavailable         Unavailable

 

                    GENDZITRI DIAZ MD Unavailable         Unavailable

 

                    GENDZIJOE, TRI KHOURY MD Unavailable         Unavailable

 

                    GENDZIJOE, TRI KHOURY MD Unavailable         Unavailable

 

                    GENDZIELECLIFFORD, TRI KHOURY MD Unavailable         Unavailable

 

                    GENDZIJOE, TRI KHOURY MD Unavailable         Unavailable

 

                    GENDZITRI DIAZ MD Unavailable         Unavailable

 

                    GENDZIJOE, TRI KHOURY MD Unavailable         Unavailable

 

                    GENDZIJOE, TRI KHOURY MD Unavailable         Unavailable

 

                    GENDZIJOE, TIR KHOURY MD Unavailable         Unavailable

 

                    GENDZIJOE, TRI KHOURY MD Unavailable         Unavailable

 

                    GENDZIJOE, TRI KHOURY MD Unavailable         Unavailable

 

                    GENDZIJOE, TRI KHOURY MD Unavailable         Unavailable

 

                    GENDZIJOE, TRI KHOURY MD Unavailable         Unavailable

 

                    GENDZIJOE, TRI KHOURY MD Unavailable         Unavailable

 

                    GENDZIJOE, TRI KHOURY MD Unavailable         Unavailable

 

                    GENDZIJOE, TRI KHOURY MD Unavailable         Unavailable

 

                    GENDZIJOE, TRI KHOURY MD Unavailable         Unavailable

 

                    GENDZIJOE, TRI KHOURY MD Unavailable         Unavailable

 

                    GENDZIJOE, TRI KHOURY MD Unavailable         Unavailable

 

                    GENDZIJOE, RTI KHOURY MD Unavailable         Unavailable

 

                    GENDZIJOE, TRI KHOURY MD Unavailable         Unavailable

 

                    GENSUMMER, TRI KHOURY MD Unavailable         Unavailable

 

                    GENDTJ, TRI KHOURY MD Unavailable         Unavailable

 

                    GENDTJ, TRI KHOURY MD Unavailable         Unavailable

 

                    GENTRI WHEELER MD Unavailable         Unavailable

 

                    GENTRI WHEELER MD Unavailable         Unavailable

 

                    GENSUMMER, TRI KHOURY MD Unavailable         Unavailable

 

                    GENTRI WHEELER MD Unavailable         Unavailable

 

                    GENDTRI SPENCER MD Unavailable         Unavailable

 

                    GENDZITRI DIAZ MD Unavailable         Unavailable

 

                    GENTRI WHEELER MD Unavailable         Unavailable

 

                    GENTRI WHEELER MD Unavailable         Unavailable

 

                    GENDTRI SPENCER MD Unavailable         Unavailable

 

                    GENDZIJOE, TRI KHOURY MD Unavailable         Unavailable

 

                    GENDZITRI DIAZ MD Unavailable         Unavailable

 

                    GENDZITRI DIAZ MD Unavailable         Unavailable

 

                    Simione-Albino,  Monica PA Unavailable         Unavailable

 

                    Simione-Albino,  Monica PA Unavailable         Unavailable

 

                    Simione-Albino,  Monica PA Unavailable         Unavailable

 

                    Simione-Albino,  Monica PA Unavailable         Unavailable

 

                    Simione-Albino,  Monica PA Unavailable         Unavailable

 

                    Simione-Albino,  Monica PA Unavailable         Unavailable

 

                    Simione-Albino,  Monica PA Unavailable         Unavailable

 

                    Simione-Albino,  Monica PA Unavailable         Unavailable

 

                    Simione-Albino,  Monica PA Unavailable         Unavailable

 

                    Simione-Albino,  Monica PA Unavailable         Unavailable

 

                    Simione-Albino,  Monica PA Unavailable         Unavailable

 

                    Simione-Albino,  Monica PA Unavailable         Unavailable

 

                    Simione-Albino,  Monica PA Unavailable         Unavailable

 

                    Simione-Albino,  Monica PA Unavailable         Unavailable

 

                    Simione-Albino,  Monica PA Unavailable         Unavailable

 

                    Simione-Albino,  Monica PA Unavailable         Unavailable

 

                    Simione-Albino,  Monica PA Unavailable         Unavailable

 

                    Simione-Albino,  Monica PA Unavailable         Unavailable

 

                    Simione-Albino,  Monica PA Unavailable         Unavailable

 

                    Simione-Albino,  Monica PA Unavailable         Unavailable

 

                    Simione-Albino,  Monica PA Unavailable         Unavailable

 

                    Simione-Albino,  Monica PA Unavailable         Unavailable

 

                    Simione-Albino,  Monica PA Unavailable         Unavailable

 

                    Simione-Albino,  Monica PA Unavailable         Unavailable

 

                    Simione-Albino,  Monica PA Unavailable         Unavailable

 

                    Simione-Albino,  Monica PA Unavailable         Unavailable

 

                    Simione-Albino,  Monica PA Unavailable         Unavailable

 

                    Simione-Albino,  Monica PA Unavailable         Unavailable

 

                    Simione-Albino,  Monica PA Unavailable         Unavailable

 

                    Simione-Albino,  Monica PA Unavailable         Unavailable

 

                    Simione-Albino,  Monica PA Unavailable         Unavailable

 

                    Simione-Albino,  Monica PA Unavailable         Unavailable

 

                    Simione-Albino,  Monica PA Unavailable         Unavailable

 

                    Holloway, L Delmis RPA Unavailable         Unavailable

 

                    Holloway, L Delmis RPA Unavailable         Unavailable

 

                    Holloway, L Delmis RPA Unavailable         Unavailable

 

                    Holloway, L Delmis RPA Unavailable         Unavailable

 

                    Holloway, L Delmis RPA Unavailable         Unavailable

 

                    Holloway, L Delmis RPA Unavailable         Unavailable

 

                    Holloway, L Delmis RPA Unavailable         Unavailable

 

                    Holloway, L Delmis RPA Unavailable         Unavailable

 

                    Holloway, L Delmis RPA Unavailable         Unavailable

 

                    Holloway, L Delmis RPA Unavailable         Unavailable

 

                    Holloway, L Delmis RPA Unavailable         Unavailable

 

                    Holloway, L Delmis RPA Unavailable         Unavailable

 

                    Holloway, L Delmis RPA Unavailable         Unavailable

 

                    Holloway, L Delmis RPA Unavailable         Unavailable

 

                    Holloway, L Delmis RPA Unavailable         Unavailable

 

                    Holloway, L Delmis RPA Unavailable         Unavailable

 

                    Holloway, L Delmis RPA Unavailable         Unavailable

 

                    Holloway, L Delmis RPA Unavailable         Unavailable

 

                    Holloway, L Delmis RPA Unavailable         Unavailable

 

                    Holloway, L Delmis RPA Unavailable         Unavailable

 

                    Holloway, L Delmis RPA Unavailable         Unavailable

 

                    Holloway, L Delmis RPA Unavailable         Unavailable

 

                    Holloway, L Delmis RPA Unavailable         Unavailable

 

                    Holloway, L Delmis RPA Unavailable         Unavailable

 

                    Holloway, L Delmis RPA Unavailable         Unavailable

 

                    Holloway, L Delmis RPA Unavailable         Unavailable

 

                    Holloway, L Delmis RPA Unavailable         Unavailable

 

                    Holloway, L Delmis RPA Unavailable         Unavailable

 

                    Holloway, L Delmis RPA Unavailable         Unavailable

 

                    Holloway, L Delmis RPA Unavailable         Unavailable

 

                    Holloway, L Delmis RPA Unavailable         Unavailable

 

                    Holloway, L Delmis RPA Unavailable         Unavailable

 

                    SAGE, A OTONIEL DO Unavailable         Unavailable

 

                    SAGE, A OTONIEL DO Unavailable         Unavailable

 

                    SAGE, A OTONIEL DO Unavailable         Unavailable

 

                    SAGE, A OTONIEL DO Unavailable         Unavailable

 

                    SAGE, A OTONIEL DO Unavailable         Unavailable

 

                    SAGE, A OTONIEL DO Unavailable         Unavailable

 

                    SAGE, A OTONIEL DO Unavailable         Unavailable

 

                    SAGE, A OTONIEL DO Unavailable         Unavailable

 

                    SAGE, A OTONIEL DO Unavailable         Unavailable

 

                    SAGE, A OTONIEL DO Unavailable         Unavailable

 

                    SAGE, A OTONIEL DO Unavailable         Unavailable

 

                    SAGE, A OTONIEL DO Unavailable         Unavailable

 

                    SAGE, A OTONIEL DO Unavailable         Unavailable

 

                    SAGE, A OTONIEL DO Unavailable         Unavailable

 

                    SAGE, A OTONIEL DO Unavailable         Unavailable

 

                    SAGE, A OTONIEL DO Unavailable         Unavailable

 

                    SAGE, A OTONIEL DO Unavailable         Unavailable

 

                    SAGE, A OTONIEL DO Unavailable         Unavailable

 

                    SAGE, A OTONIEL DO Unavailable         Unavailable

 

                    SAGE, A OTONIEL DO Unavailable         Unavailable

 

                    SAGE, A OTONIEL DO Unavailable         Unavailable

 

                    SAGE, A OTONIEL DO Unavailable         Unavailable



                                  



Re-disclosure Warning

          The records that you are about to access may contain information from 
federally-assisted alcohol or drug abuse programs. If such information is 
present, then the following federally mandated warning applies: This information
has been disclosed to you from records protected by federal confidentiality 
rules (42 CFR part 2). The federal rules prohibit you from making any further 
disclosure of this information unless further disclosure is expressly permitted 
by the written consent of the person to whom it pertains or as otherwise 
permitted by 42 CFR part 2. A general authorization for the release of medical 
or other information is NOT sufficient for this purpose. The Federal rules 
restrict any use of the information to criminally investigate or prosecute any 
alcohol or drug abuse patient.The records that you are about to access may 
contain highly sensitive health information, the redisclosure of which is 
protected by Article 27-F of the [facility] Public Health law. If you 
continue you may have access to information: Regarding HIV / AIDS; Provided by 
facilities licensed or operated by the [facility] Office of Mental Health; 
or Provided by the [facility] Office for People With Developmental 
Disabilities. If such information is present, then the following [place] 
mandated warning applies: This information has been disclosed to you from 
confidential records which are protected by state law. State law prohibits you 
from making any further disclosure of this information without the specific 
written consent of the person to whom it pertains, or as otherwise permitted by 
law. Any unauthorized further disclosure in violation of state law may result in
a fine or group home sentence or both. A general authorization for the release of 
medical or other information is NOT sufficient authorization for further disc
losure.                                                                         
    



Allergies and Adverse Reactions

          



           Type       Description Substance  Reaction   Status     Data Source(s

)

 

           Allergy to substance No Known Allergies No known allergies (situation

)                       

PATRICE (Juan Manuel Perdomo MD [facility])

 

           Allergy to substance No Known Allergies No known allergies (situation

)                       

PATRICE (Juan Manuel Perdomo MD [facility])



                                                                                
                 



Family History

          



             Family Member Name Family Member Gender Family Member Status Date o

f Status 

Description                             Data Source(s)

 

           Unknown    Male       Problem                          [facility] [place]

 

           Unknown    Unknown    Problem                          [facility] ([facility]

 

                                        all healthy - 1 full biologic, 10 yr old 

Roxy at [facility], 23yr old son Rene & 3 

girls in [place]. 

 

           Unknown    Unknown    Problem                          [facility] ([facility])

 

           Unknown    Female     Problem                          [facility] ([facility] )



                                                                                
                           



Encounters

          



           Encounter  Providers  Location   Date       Indications Data Source(s

)

 

                Outpatient      Attender: IRAIDA CARPENTER MD [email or web address] 

 10/06/2021 12:00:00 

AM EDT - 10/06/2021 01:56:20 PM EDT                           [facility]

 

                Outpatient      Attender: Delmis Pearce/Delicia/Dao kiran 2021 

10:15:00 AM EDT                                     [facility] ([facility]

 

                                        Outpatient<td ID="encounterTypeDescripti

onID0">EXCISION OF EPIDERMAL INCLUSION 

CYST ** IN OFFICE **</td><td>Otoniel Ledbetter DO</td><td>Juan Manuel Stein MD 
[facility]</td><td>2021</td><td>7:09AM</td><td>8:14AM</td><td></td> Attender: 

OTONIEL Wong MD [facility]  2021 07:09:00 AM EDT -

 

2021 08:14:00 AM EDT                           PATRICE (Juan Manuel glass MD [facility])

 

                                        Outpatient<td ID="encounterTypeDescripti

onID1">Returning Patient with 

Referral</td><td>Otoniel Ledbetter DO</td><td>Juan Manuel Stein MD 
[facility]</td><td>2021</td><td>12:24PM</td><td>1:10PM</td><td><content 
ID="encounterDiagnosisID1-0">Blepharitis Squamous</content>, <content 
ID="encounterDiagnosisID1-1">Dry Eye Syndrome</content>, <content 
ID="encounterDiagnosisID1-2">Pinguecula</content>, <content 
ID="encounterDiagnosisID1-3">Conjunctivitis Chronic Allergic</content>, <content
ID="encounterDiagnosisID1-4">Epidermal Inclusion Cyst</content>, <content 
ID="encounterDiagnosisID1-5">Vitreous Disorders Degeneration</content>, <content
ID="encounterDiagnosisID1-6">Taking Medication For Diabetes Long-term Use of 
Insulin</content>, <content ID="encounterDiagnosisID1-7">Diabetes Mellitus Type 
2 Without Complication</content></td> Attender: OTONIEL Wong MD [facility]        2021 12:24:00 PM EDT - 2021 01:10:00 PM ED

T 

Diabetes Mellitus Type 2 Without ComplicationTaking Medication For Diabetes 
Long-term Use of InsulinVitreous Disorders DegenerationEpidermal Inclusion 
CystConjunctivitis Chronic AllergicPingueculaDry Eye SyndromeBlepharitis 
SquamousDiabetes Mellitus Type 2 Without ComplicationTaking Medication For 
Diabetes Long-term Use of InsulinVitreous Disorders DegenerationEpidermal 
Inclusion CystConjunctivitis Chronic AllergicPingueculaDry Eye 
SyndromeBlepharitis Squamous            PATRICE (Juan Manuel Perdomo MD [facility])

 

                                        Diabetes Mellitus Type 2 Without Complic

ation 

 

                                        Taking Medication For Diabetes Long-term

 Use of Insulin 

 

                                        Vitreous Disorders Degeneration 

 

                                        Epidermal Inclusion Cyst 

 

                                        Conjunctivitis Chronic Allergic 

 

                                        Pinguecula 

 

                                        Dry Eye Syndrome 

 

                                        Blepharitis Squamous 

 

                                        Diabetes Mellitus Type 2 Without Complic

ation 

 

                                        Taking Medication For Diabetes Long-term

 Use of Insulin 

 

                                        Vitreous Disorders Degeneration 

 

                                        Epidermal Inclusion Cyst 

 

                                        Conjunctivitis Chronic Allergic 

 

                                        Pinguecula 

 

                                        Dry Eye Syndrome 

 

                                        Blepharitis Squamous 

 

                Outpatient      Attender: David Seaman 04/15

/2021 11:45:00 AM EDT

                                                    [facility] ([facility]

)

 

                Outpatient      Attender: Monica VAZQUEZ-MOCAM.E

N  2021 12:00:00

AM EDT - 2021 03:23:14 PM EDT                           [facility]

 

                Outpatient      Attender: David Seaman  02:00:00 PM EST

                                                    [facility] ([facility]

)



                                                                                
                                                                   



Immunizations

          



             Vaccine      Date         Status       Description  Data Source(s)

 

             COVID-19 VACCINE Pfizer 10/07/2021 12:00:00 AM EDT completed       

          [facility]

 

                                        Vaccine Series Complete: YESThis Data wa

s Submitted to [facility]. 

 

                2021 12:00:00 AM EST completed       <td I

D="[id number]">Covid-19 

(Pfizer)</td><td>2021, 2020</td><td></td> [facility]

 

             COVID-19 VACCINE Pfizer 2021 12:00:00 AM EST completed       

          [facility]

 

                                        Vaccine Series Complete: YESThis Data wa

s Submitted to [facility]. 

 

             COVID-19 VACCINE Pfizer 2021 12:00:00 AM EST completed       

          [facility]

 

                                        Vaccine Series Complete: YESThis Data wa

s Submitted to [facility]. 

 

                208             2020 12:00:00 AM EST completed       <td I

D="[id number]">Covid-19 

(Pfizer)</td><td>2021, 2020</td><td></td> [facility]

 

             COVID-19 VACCINE Pfizer 2020 12:00:00 AM EST completed       

          [facility]

 

                                        Vaccine Series Complete: NOThis Data was

 Submitted to [facility]. 



                                                                                
                                                         



Medications

          



          Medication Brand Name Start Date Product Form Dose      Route     Admi

nistrative 

Instructions Pharmacy Instructions Status     Indications Reaction   Description

 Data 

Source(s)

 

                          Methimazole 5 MG Oral Tablet methimazole (TAPAZOLE) 5 

MG tablet methimazole 

(TAPAZOLE) 5 MG tablet 10/06/2021 12:00:00 AM EDT                               

     active               take 5 mg

one day per week                        [facility]

 

                          telmisartan 80 MG Oral Tablet telmisartan (MICARDIS) 8

0 MG tablet telmisartan 

(MICARDIS) 80 MG tablet 2021 12:00:00 AM EDT        80 mg  Oral           

      active               

Take 1 tablet (80 mg total) by mouth daily [facility]

 

                          Methimazole 5 MG Oral Tablet methimazole (TAPAZOLE) 5 

MG tablet methimazole 

(TAPAZOLE) 5 MG tablet 2021 12:00:00 AM EDT                               

     aborted               Take 5 

mg 2 days per week Monday and Thursday  [facility]

 

                          Dexamethasone 1 MG/ML / Tobramycin 3 MG/ML Ophthalmic 

Suspension 

Tobramycin-Dexamethasone 2021 12:00:00 AM EDT                             

  completed                   

[facility] ([facility])

 

                                        Dexamethasone 1 MG/ML / Tobramycin 3 MG/

ML Ophthalmic Suspension tobramycin-

dexamethasone (TOBRADEX) ophthalmic solution tobramycin-dexamethasone (TOBRADEX)

ophthalmic solution 2021 12:00:00 AM EDT        1 [drp]                   

   active               

Administer 1 drop to both eyes 3 (three) times a day [facility]

 

                          telmisartan 80 MG Oral Tablet telmisartan (MICARDIS) 8

0 MG tablet telmisartan 

(MICARDIS) 80 MG tablet 2021 12:00:00 AM EDT        80 mg  Oral           

      aborted               

Take 1 tablet (80 mg total) by mouth daily [facility]

 

                                        24 HR metoprolol succinate 25 MG Extende

d Release Oral Tablet metoprolol 

succinate (TOPROL-XL) 25 MG 24 hr tablet metoprolol succinate (TOPROL-XL) 25 MG 

24 hr tablet 2021 12:00:00 AM EST        25 mg  Oral                 activ

e               Take 1 

tablet (25 mg total) by mouth daily     [facility]

 

                          Amlodipine 10 MG Oral Tablet amLODIPine (NORVASC) 10 M

G tablet amLODIPine 

(NORVASC) 10 MG tablet 2021 12:00:00 AM EST        10 mg  Oral            

     active               

Take 1 tablet (10 mg total) by mouth daily [facility]

 

       Lancets 30G MISC [id number] 12/15/2020 12:00:00 AM EST                  

                  active Type 2 

diabetes mellitus without complication, with long-term current use of insulin   

                        

test twice a day and as needed e11.9    [facility]

 

                                        Type 2 diabetes mellitus without complic

ation, with long-term current use of 

insulin 

 

                    glucose blood (ONE TOUCH ULTRA TEST) test strip [id number]       

        12/15/2020 12:00:00 AM EST

                                                            active    Type 2 josé miguel

betes mellitus without complication, with long-term 

current use of insulin                     Test bid or if symptomatic mdd 3 [facility]

 

                                        Type 2 diabetes mellitus without complic

ation, with long-term current use of 

insulin 

 

                          atorvastatin 10 MG Oral Tablet atorvastatin (LIPITOR) 

10 MG tablet atorvastatin 

(LIPITOR) 10 MG tablet 12/15/2020 12:00:00 AM EST         10 mg   Oral          

          active  

Hypercholesterolemia                     Take 1 tablet (10 mg total) by mouth reddy burdick [facility]

 

                                        Hypercholesterolemia 

 

                          Semaglutide,0.25 or 0.5MG/DOS, (OZEMPIC, 0.25 OR 0.5 M

G/DOSE,) 2 MG/1.5ML SOPN 

[id number]  2020 12:00:00 AM EST                                         activ

e  Type 2 diabetes mellitus 

without complication, with long-term current use of insulin                     

0. 5 mg weekly      [facility]

 

                                        Type 2 diabetes mellitus without complic

ation, with long-term current use of 

insulin 

 

                          Methimazole 5 MG Oral Tablet methimazole (TAPAZOLE) 5 

MG tablet methimazole 

(TAPAZOLE) 5 MG tablet 2020 12:00:00 AM EDT                               

     aborted               Take 5 

mg 5 days per week                      [facility]

 

                          tadalafil 10 MG Oral Tablet tadalafil (CIALIS) 10 MG t

ablet tadalafil (CIALIS) 

10 MG tablet 2019 12:00:00 AM EST                                    abort

ed               TAKE 1TAB AS 

NEEDED FOR ERECTILE DYSFUNCTION 1H PRIOR TO INTERCOURSE REPLACES SILDENAFIL [facility]



                                                                                
                                                                                
                                      



Insurance Providers

          



             Payer name   Policy type / Coverage type Policy ID    Covered party

 ID Covered 

party's relationship to conroy Policy Conroy             Plan Information

 

          [employer] [place]           [id number]           SP        

          [id number]

 

          [employer] [place] /[address]           [id number]           SP       

           [id number]

 

          [employer] [place] /307           [id number]           SP       

           [id number]

 

          [employer]           [id number]           Ariadne                 VYA

[id number]

 

          [employer]  H         [id number]           Self                [id number]

 

          [employer]           [id number]           Ariadne                 VYA

[id number]

 

                [employer] [place] [employer] Commercial      [id number]    

[id number] Self                                    

[id number]

 

          [employer]           [id number]  xxxxxxxxxxxx                     [id number]

[id number]

 

          [employer]           [id number]           Ariadne                 VYA

[id number]

 

          [employer]           [id number]           Ariadne                 VYA

[id number]

 

                [employer] [place] [employer] Commercial      [id number]    

[id number] Self                                    

[id number]

 

          [employer] [place]  Commercial [id number]

4.0 Self                

[id number]

 

          [employer] [place]  Commercial [id number]

4.0 Self                

[id number]

 

                [employer] [place] [employer] Commercial      [id number]    

[id number] Self                                    

[id number]

 

          [employer] [place] [employer] Commercial           [id number]     Self              

   

 

          [employer] (H[employer]) [employer]     [id number]

6     Self                Usbpi

 

          [employer] BCBS P         [id number]                   VYA

[id number]

 

          [employer] BCBS           PI                                      PI

 

          BCBS of [facility] Other     0         [id number] Self    

            0

 

                [employer] [facility] Health Maintenance Organization (O) [id number]

48    

[id number] Self                                    

[id number]

 

                [employer] [facility] Health Maintenance Organization ([facility]) [id number]

0948    

[id number] Self                                    

[id number]

 

          [employer] of [facility] Other     0         [id number] Self    

            0

 

                [employer] [facility] Health Maintenance Organization ([facility]) [id number]

0948    

[id number] Self                                    

[id number]

 

          [facility] [employer]           [id number]    Self                

 



                                                                                
                                                                                
                                                                                
                                                                              



Problems, Conditions, and Diagnoses

          



           Code       Display Name Description Problem Type Effective Dates Data

 Source(s)

 

                    E78.00              Pure hypercholesterolemia, unspecified P

ure hypercholesterolemia, 

unspecified         Diagnosis           10/06/2021 01:07:53 PM EDT [facility]

 

                Z79.4           Long term (current) use of insulin Long term (cu

rrent) use of insulin 

Diagnosis                 10/06/2021 01:07:53 PM EDT [facility]

s

 

                    E11.9               Type 2 diabetes mellitus without complic

ations Type 2 diabetes mellitus 

without complic     Diagnosis           10/06/2021 01:07:53 PM EDT [facility]

 

             I10          Essential (primary) hypertension Essential (primary) h

ypertension Diagnosis    

10/06/2021 01:07:53 PM EDT              [facility]

 

                    E05.00              Thyrotoxicosis with diffuse goiter witho

ut thyrotoxic crisis or storm 

Thyrotoxicosis with diffuse goiter witho Diagnosis           10/06/2021 01:07:53

 PM EDT 

[facility]

 

                    E05.90              Thyrotoxicosis, unspecified without thyr

otoxic crisis or storm 

Thyrotoxicosis, unspecified without thyr Diagnosis           2021 02:29:16

 PM EDT 

[facility]

 

             379.21       Vitreous Disorders Degeneration Vitreous Disorders Deg

eneration Problem      

2021 12:00:00 AM EDT              PATRICE (Juan Manuel Perdomo MD [facility])

 

           375.15     Dry Eye Syndrome Dry Eye Syndrome Problem    2021 12

:00:00 AM EDT 

PATRICE (Juan Manuel Perdomo MD [facility])

 

             706.2        Epidermal Inclusion Cyst Epidermal Inclusion Cyst Prob

katie      2021 

12:00:00 AM EDT                         PATRICE (Juan Manuel Perdomo MD [facility])

 

           372.51     Pinguecula Pinguecula Problem    2021 12:00:00 AM ED

T PATRICE (Juan Manuel Perdomo MD [facility])

 

                    [id number]           Long-term current use of insulin (situat

ion) Taking Medication For 

Diabetes Long-term Use of Insulin Problem             2021 12:00:00 AM EDT

 PATRICE 

(Juan Manuel Perdomo MD [facility])

 

                    250.00              Diabetes Mellitus Type 2 Without Complic

ation Diabetes Mellitus Type 2 

Without Complication Problem             2021 12:00:00 AM EDT PATRICE (Reddy Perdomo MD [facility])

 

             372.14       Conjunctivitis Chronic Allergic Conjunctivitis Chronic

 Allergic Problem      

2021 12:00:00 AM EDT              PATRICE (Juan Manuel Perdomo MD [facility])

 

             H01.02A      Blepharitis Squamous Blepharitis Squamous Problem     

 2021 12:00:00 AM

 EDT                                    PATRICE (Juan Manuel Perdomo MD [facility])

 

             379.21       Vitreous Disorders Degeneration Vitreous Disorders Deg

eneration Problem      

2021 12:00:00 AM EDT              PATRICE (Juan Manuel Perdomo MD [facility])

 

           375.15     Dry Eye Syndrome Dry Eye Syndrome Problem    2021 12

:00:00 AM EDT 

PATRICE (Juan Manuel Perdomo MD [facility])

 

             706.2        Epidermal Inclusion Cyst Epidermal Inclusion Cyst Prob

katie      2021 

12:00:00 AM EDT                         PATRICE (Juan Manuel Perdomo MD [facility])

 

           372.51     Pinguecula Pinguecula Problem    2021 12:00:00 AM ED

T PATRICE (Juan Manuel Perdomo MD [facility])

 

                    [id number]           Long-term current use of insulin (situat

ion) Taking Medication For 

Diabetes Long-term Use of Insulin Problem             2021 12:00:00 AM EDT

 PATRICE 

(Juan Manuel Perdomo MD [facility])

 

                    250.00              Diabetes Mellitus Type 2 Without Complic

ation Diabetes Mellitus Type 2 

Without Complication Problem             2021 12:00:00 AM EDT PATRICE (Reddy Perdomo MD [facility])

 

             372.14       Conjunctivitis Chronic Allergic Conjunctivitis Chronic

 Allergic Problem      

2021 12:00:00 AM EDT              PATRICE (Juan Manuel Perdomo MD [facility])

 

             H01.02A      Blepharitis Squamous Blepharitis Squamous Problem     

 2021 12:00:00 AM

 EDT                                    PATRICE (Juan Manuel Perdomo MD [facility])

 

             [id number]     Essential hypertension Essential hypertension Problem 

     2020 

12:00:00 AM EST                         [facility] ([facility])



                                                                                
                                                                                
                                                                                
                                                                              



Surgeries/Procedures

          



             Procedure    Description  Date         Indications  Data Source(s)

 

             OFFICE OUTPATIENT VISIT 15 MINUTES              2021 12:00:00

 AM EDT              [facility] 

([facility])

 

                    No surgical / procedural history No surgical / procedural hi

story 2021 

12:00:00 AM EDT                                     PATRICE (Juan Manuel Perdomo MD [facility])

 

             Medical Eye Exam Medical Eye Exam 2021 12:00:00 AM EDT       

       PATRICE (Juan Manuel Perdomo MD [facility])

 

                    No surgical / procedural history No surgical / procedural hi

story 2021 

12:00:00 AM EDT                                     PATRICE (Juan Manuel Perdomo MD [facility])

 

             Medical Eye Exam Medical Eye Exam 2021 12:00:00 AM EDT       

       PATRICE (Juan Manuel Perdomo MD [facility])

 

             OFFICE OUTPATIENT VISIT 15 MINUTES              04/15/2021 12:00:00

 AM EDT              [facility] 

([facility])

 

             XTRNL ECG < 48 HR RECORDING              2020 12:00:00 AM EST

              [facility] ([facility])

 

             XTRNL ECG CONTINUOUS RHYTHM PHYS REVIEW&INTERPJ              2020 12:00:00 AM EST              

[facility] ([facility])

 

             ECG ROUTINE ECG W/LEAST 12 LDS W/I&R              2020 12:00:

00 AM EST              [facility] 

([facility])



                                                                                
                                                                                
        



Results

          



                    ID                  Date                Data Source

 

                    [id number]          10/05/2021 10:21:00 AM EDT [facility] ([facility])









          Name      Value     Range     Interpretation Code Description Data Anitra

rce(s) Supporting 

Document(s)

 

          Hemoglobin A1c 6.7 %                                   [facility] ([facility])  

 

                                        <content>REFERENCE RANGES:</content><br/

><content></content><br/><content><=5.6%

    NORMAL</content><br/><content>5.7-6.4%  SUGGESTS IMPAIRED GLUCOSE 
METABOLISM/PREDIABETIC</content><br/><content>>= 6.5%  
ABNORMAL</content><br/><content></content> 

 

          Estimated Average Glucose 146 mg/dL                         [facility]

NT ([facility])  









                    ID                  Date                Data Source

 

                    [id number]          10/05/2021 09:46:00 AM EDT [facility] ([facility])









          Name      Value     Range     Interpretation Code Description Data Anitra

rce(s) Supporting 

Document(s)

 

           Thyroid Stimulating Hormone 1.300 uIU/ML 0.358-3.740                 

      [facility] ([facility]                              

 

          Free T4   1.07 ng/dL 0.76-1.46                     [facility]  









                    ID                  Date                Data Source

 

                    [id number]          10/05/2021 09:46:00 AM EDT [facility] ([facility])









          Name      Value     Range     Interpretation Code Description Data Anitra

rce(s) Supporting 

Document(s)

 

          Triglycerides Level 107 mg/dL                               [facility] ([facility])  

 

          Cholesterol Level 166 mg/dL                               [facility] ([facility])  

 

          HDL Cholesterol 69 mg/dL                                [facility] ([facility]

[facility])  

 

          LDL Cholesterol 76 mg/dL                                [facility] ([facility])  

 

          Cholesterol Risk Ratio 2.405                                   [facility] 

([facility])  

 

          Non-HDL-C 97 mg/dL                                [facility] ([facility]  









                    ID                  Date                Data Source

 

                    [id number]          2021 08:08:00 AM EDT [facility] ([facility])









          Name      Value     Range     Interpretation Code Description Data Anitra

rce(s) Supporting 

Document(s)

 

           Cholesterol [Mass/volume] in Serum or Plasma 172 mg/dL  131-200      

                    [facility] 

([facility])                   

 

           Cholesterol in HDL [Mass/volume] in Serum or Plasma 68 mg/dL   35-60 

                           [facility] 

([facility])                   

 

           Triglyceride [Mass/volume] in Serum or Plasma 189 mg/dL        

                     [facility] 

([facility])                   

 

                    Cholesterol in LDL [Mass/volume] in Serum or Plasma by calcu

lation 66 CALC             

                                          [facility] ([facility])  









                    ID                  Date                Data Source

 

                    [id number]          2021 08:08:00 AM EDT [facility] ([facility])









          Name      Value     Range     Interpretation Code Description Data Anitra

rce(s) Supporting 

Document(s)

 

           Glucose [Mass/volume] in Serum or Plasma 147 mg/dL  74-99            

                [facility] ([facility])                              

 

                                        100-125 mg/dL     PRE-DIABETES/FASTING

>126 mg/dL          DIABETES/FASTING

 

 

           Urea nitrogen [Mass/volume] in Serum or Plasma 11 mg/dL   7-18       

                      [facility] 

([facility])                   

 

          Creatinine 1.2 mg/dL 0.6-1.3                       [facility] ([facility]

[facility])  

 

           Sodium [Moles/volume] in Serum or Plasma 136 meq/L  136-145          

                [facility] ([facility])                             

 

           Potassium [Moles/volume] in Serum or Plasma 3.7 meq/L  3.5-5.1       

                   [facility] 

([facility])                   

 

           Chloride [Moles/volume] in Serum or Plasma 98 meq/L            

                  [facility] ([facility])                             

 

           Calcium [Mass/volume] in Serum or Plasma 9.2 mg/dL  8.5-10.1         

                [facility] 

([facility])                   

 

           Carbon dioxide, total [Moles/volume] in Serum or Plasma 27 meq/L   21

-32                            

[facility] ([facility])            

 

          Total Bilirubin 1.1 mg/dL 0.2-1.0                       [facility] ([facility])  

 

                    Alkaline phosphatase isoenzyme [Units/volume] in Serum or Pl

asma 83 mg/dL            

                                                [facility] ([facility])  

 

                          Aspartate aminotransferase [Enzymatic activity/volume]

 in Serum or Plasma 23 U/L

             15-37                                  [facility] ([facility]

)  

 

                    Alanine aminotransferase [Enzymatic activity/volume] in Seru

m or Plasma 36 U/L              

12-78                                           [facility] ([facility])  

 

           Albumin [Mass/volume] in Serum or Plasma 3.6 g/dL   3.4-5.0          

                [facility] ([facility])                              

 

          A/G Ratio 1.00 CALC 1.00-1.90                     [facility]  

 

           Proteinase 3 Ab [Units/volume] in Serum 7.2 g/dL   6.4-8.2           

               [facility] ([facility])                              

 

                                        Glomerular filtration rate/1.73 sq M pre

dicted among non-blacks [Volume 

Rate/Area] in Serum or Plasma by Creatinine-based formula (MDRD) Laboratory test

result                                              [facility]

)  

 

                                        Glomerular filtration rate/1.73 sq M pre

dicted among blacks [Volume Rate/Area] 

in Serum or Plasma by Creatinine-based formula (MDRD) Laboratory test result    

                  

                                        [facility]  

 

                                        <content>CHRONIC KIDNEY DISEASE STAGING 

PER 

[facility]</content><br/><content></content><br/><content>STAGE I & II      GFR >= 60  
     NORMAL TO MILDLY DECREASED</content><br/><content>STAGE III          GFR 
30-59          MODERATELY DECREASED</content><br/><content>STAGE IV           
GFR 15-29         SEVERELY DECREASED</content><br/><content>STAGE V            
GFR <15            VERY LITTLE GFR LEFT</content><br/><content>ESRD             
   GFR <15            ON RRT</content><br/><content></content> 









                    ID                  Date                Data Source

 

                    [id number]          2021 08:08:00 AM EDT [facility]









          Name      Value     Range     Interpretation Code Description Data Anitra

rce(s) Supporting 

Document(s)

 

           Hemoglobin A1c/Hemoglobin.total in Blood 7.0 %                       

                [facility]                              

 

                                        Lab Result Notes:

Pre-Diabetes   5.7 - 6.4 %

Diabetes           = or > 6.5%

 

 

                          Glucose mean value [Mass/volume] in Blood Estimated fr

om glycated hemoglobin 154

 mg/dL                                        [facility] ([facility]

)  









                    ID                  Date                Data Source

 

                    [id number]          2021 08:08:00 AM EDT [facility]









          Name      Value     Range     Interpretation Code Description Data Anitra

rce(s) Supporting 

Document(s)

 

           Hemoglobin A1c/Hemoglobin.total in Blood Laboratory test result      

                            [facility] 

([facility]                   









                    ID                  Date                Data Source

 

                    [id number]          2021 09:19:00 AM EDT [facility] ([facility])









          Name      Value     Range     Interpretation Code Description Data Anitra

rce(s) Supporting 

Document(s)

 

          Creatinine, Urine 110.0 mg/dL                               [facility] ([facility])  

 

          Malb Urine Siemens 26.0 mg/L                               [facility] ([facility])  

 

          Monster/Creat Ratio 23.6 MCG/MG 0.0-30.0                      [facility] ([facility])  

 

                                        THE AMERICAN DIABETES ASSOCIATION STATES

 THAT

MICROALBUMINURIA IS PRESENT IF THE MICROALBUMIN/CREATININE

RATIO EXCEEDS 30 MCG/MG.  THE THRESHOLD FOR CLINICAL

ALBUMINURIA IS REACHED  MCG/MG.  THE CLASSIFICATION OF

A PATIENT SHOULD BE BASED UPON AT LEAST 2 OF 3 ABNORMAL

RESULTS ON SPECIMENS COLLECTED WITHIN A 3 TO 6 MONTH TIME

FRAME.

 









                    ID                  Date                Data Source

 

                    [id number]          2021 09:19:00 AM EDT [facility]









          Name      Value     Range     Interpretation Code Description Data Anitra

rce(s) Supporting 

Document(s)

 

             Triiodothyronine (T3) Free [Mass/volume] in Serum or Plasma 2.6 pg/

mL    2.2-4.0                   

                          [facility] ([facility]  









                    ID                  Date                Data Source

 

                    [id number]          2021 09:19:00 AM EDT [facility]









          Name      Value     Range     Interpretation Code Description Data Anitra

rce(s) Supporting 

Document(s)

 

          Free T4   0.97 ng/dL 0.76-1.46                     [facility] ([facility]

[facility])  

 

           Thyroid Stimulating Hormone 1.740 uIU/ML 0.358-3.740                 

      [facility] ([facility])                              









                    ID                  Date                Data Source

 

                    [id number]          2021 09:19:00 AM EDT [facility]









          Name      Value     Range     Interpretation Code Description Data Anitra

rce(s) Supporting 

Document(s)

 

           Prostate specific Ag [Mass/volume] in Serum or Plasma 0.45 ng/mL     

                             [facility] 

([facility]                   

 

                                        The PSA assay is performed on the Siemen

s Vista analyzer by

LOCI sandwich chemiluminescent immunoassay and should not be

compared interchangeably with other methods.  It should not

be used alone as a screening test or diagnosis for the

presence or absence of malignant disease.  Predictions of

disease recurrence should not be based solely on values

obtained from serial patient serum values.

 









                    ID                  Date                Data Source

 

                    [id number]          2021 09:19:00 AM EDT [facility] ([facility])









          Name      Value     Range     Interpretation Code Description Data Anitra

rce(s) Supporting 

Document(s)

 

          Blood Urea Nitrogen 10 mg/dL  7-18                          [facility] ([facility])  

 

          Glucose, Fasting 132 mg/dL                         [facility] ([facility])  

 

          Creatinine For GFR 1.15 mg/dL 0.70-1.30                     [facility] ([facility])  

 

           Glomerular Filtration Rate Laboratory test result                    

              [facility] ([facility])                              

 

                                        <content>Units are mL/min/1.73 

m2</content><br/><content></content><br/><content>Chronic Kidney Disease Staging
 per NKF:</content><br/><content></content><br/><content>Stage I & II   GFR >=60
       Normal to Mildly Decreased</content><br/><content>Stage III      GFR 30-
59      Moderately Decreased</content><br/><content>Stage IV       GFR 15-29    
  Severely Decreased</content><br/><content>Stage V        GFR <15        Very 
Little GFR Left</content><br/><content>ESRD           GFR <15 on 
RRT</content><br/><content></content> 

 

          Sodium Level 135 meq/L 136-145                       [facility] ([facility])  

 

          Potassium Serum 3.8 meq/L 3.5-5.1                       [facility] ([facility])  

 

          Chloride Level 101 meq/L                         [facility] ([facility])  

 

          Anion Gap 7 meq/L   8-16                          [facility] ([facility]  

 

          Carbon Dioxide Level 27 meq/L  21-32                         [facility]  

 

          Calcium Level 9.2 mg/dL 8.8-10.2                      [facility] ([facility])  









                    ID                  Date                Data Source

 

                    [id number]          2021 09:47:00 AM EST [facility] ([facility])









          Name      Value     Range     Interpretation Code Description Data Anitra

rce(s) Supporting 

Document(s)

 

          Creatinine, Urine 132.0 mg/dL                               [facility] ([facility])  

 

          Monster/Creat Ratio 15.2 MCG/MG 0.0-30.0                      [facility] ([facility])  

 

                                        THE AMERICAN DIABETES ASSOCIATION STATES

 THAT

MICROALBUMINURIA IS PRESENT IF THE MICROALBUMIN/CREATININE

RATIO EXCEEDS 30 MCG/MG.  THE THRESHOLD FOR CLINICAL

ALBUMINURIA IS REACHED  MCG/MG.  THE CLASSIFICATION OF

A PATIENT SHOULD BE BASED UPON AT LEAST 2 OF 3 ABNORMAL

RESULTS ON SPECIMENS COLLECTED WITHIN A 3 TO 6 MONTH TIME

FRAME.

 

 

          Malb Urine Siemens 20.1 mg/L                               [facility] ([facility])  









                    ID                  Date                Data Source

 

                    [id number]          2021 09:46:00 AM EST [facility] ([facility])









          Name      Value     Range     Interpretation Code Description Data Anitra

rce(s) Supporting 

Document(s)

 

          Free T4   1.03 ng/dL 0.76-1.46                     [facility]  

 

           Thyroid Stimulating Hormone 1.410 uIU/ML 0.358-3.740                 

      [facility] ([facility])                              









                    ID                  Date                Data Source

 

                    [id number]          2021 09:46:00 AM EST [facility] ([facility])









          Name      Value     Range     Interpretation Code Description Data Anitra

rce(s) Supporting 

Document(s)

 

          Triglycerides Level 117 mg/dL                               [facility] ([facility])  

 

          LDL Cholesterol 53 mg/dL                                [facility] ([facility]

[facility])  

 

          Cholesterol Level 148 mg/dL                               [facility] ([facility])  

 

          HDL Cholesterol 72 mg/dL                                [facility] ([facility]

[facility])  

 

          Non-HDL-C 76 mg/dL                                [facility]  

 

          Cholesterol Risk Ratio 2.055                                   [facility] 

([facility])  









                    ID                  Date                Data Source

 

                    [id number]          2021 09:46:00 AM EST [facility] ([facility])









          Name      Value     Range     Interpretation Code Description Data Anitra

rce(s) Supporting 

Document(s)

 

          Hemoglobin A1c 6.5 %                                   [facility] ([facility])  

 

                                        <content>REFERENCE RANGES:</content><br/

><content></content><br/><content><=5.6%

    NORMAL</content><br/><content>5.7-6.4%  SUGGESTS IMPAIRED GLUCOSE 
METABOLISM/PREDIABETIC</content><br/><content>>= 6.5%  
ABNORMAL</content><br/><content></content> 

 

          Estimated Average Glucose 140 mg/dL                         [facility] ([facility])  









                    ID                  Date                Data Source

 

                    [id number]          2020 09:11:00 AM EST [facility] ([facility]









          Name      Value     Range     Interpretation Code Description Data Anitra

rce(s) Supporting 

Document(s)

 

           Prostate specific Ag [Mass/volume] in Serum or Plasma 0.48 ng/mL     

                             [facility] 

([facility]                   

 

                                        This assay was performed on the Siemens 

Nippo EXL using the B-

Galactosidase/CPRG methodology and should not be compared interchangeably with 
other methods.  The PSA should not be used alone as a screening test for the 
presence or absence of malignant disease.

 









                    ID                  Date                Data Source

 

                    [id number]          2020 09:11:00 AM EST [facility] ([facility]









          Name      Value     Range     Interpretation Code Description Data Anitra

rce(s) Supporting 

Document(s)

 

                          Thyrotropin [Units/volume] in Serum or Plasma by Detec

tion limit <= 0.05 mIU/L 

1.31 uIU/mL  0.36-3.74                              [facility] ([facility]

)  

 

           Thyroxine (T4) free [Mass/volume] in Serum or Plasma 1.02 ng/dL 0.76-

1.46                        

[facility] ([facility])            









                    ID                  Date                Data Source

 

                    [id number]          2020 09:11:00 AM EST [facility] ([facility]









          Name      Value     Range     Interpretation Code Description Data Anitra

rce(s) Supporting 

Document(s)

 

           Triglyceride [Mass/volume] in Serum or Plasma 106 mg/dL        

                     [facility] 

([facility])                   

 

           Cholesterol [Mass/volume] in Serum or Plasma 159 mg/dL  131-200      

                    [facility] 

([facility])                   

 

                    Cholesterol in LDL [Mass/volume] in Serum or Plasma by calcu

lation 65 CALC             

                                          [facility] ([facility])  

 

           Cholesterol in HDL [Mass/volume] in Serum or Plasma 73 mg/dL   35-60 

                           [facility] 

([facility])                   









                    ID                  Date                Data Source

 

                    [id number]          2020 09:11:00 AM EST [facility] ([facility])









          Name      Value     Range     Interpretation Code Description Data Anitra

rce(s) Supporting 

Document(s)

 

           Glucose [Mass/volume] in Serum or Plasma 127 mg/dL  74-99            

                [facility] ([facility])                              

 

                                        100-125 mg/dL     PRE-DIABETES/FASTING

>126 mg/dL          DIABETES/FASTING

 

 

           Urea nitrogen [Mass/volume] in Serum or Plasma 13 mg/dL   7-18       

                      [facility] 

([facility])                   

 

          Creatinine 1.1 mg/dL 0.6-1.3                       [facility] ([facility]

[facility])  

 

           Sodium [Moles/volume] in Serum or Plasma 137 meq/L  136-145          

                [facility] ([facility])                             

 

           Potassium [Moles/volume] in Serum or Plasma 3.9 meq/L  3.5-5.1       

                   [facility] 

([facility])                   

 

           Carbon dioxide, total [Moles/volume] in Serum or Plasma 25 meq/L   21

-32                            

[facility] ([facility]            

 

           Chloride [Moles/volume] in Serum or Plasma 101 meq/L           

                  [facility] 

([facility])                   

 

           Calcium [Mass/volume] in Serum or Plasma 8.9 mg/dL  8.5-10.1         

                [facility] 

([facility]                   

 

                    Alkaline phosphatase isoenzyme [Units/volume] in Serum or Pl

asma 76 mg/dL            

                                                [facility]  

 

          Total Bilirubin 1.0 mg/dL 0.2-1.0                       [facility] ([facility])  

 

                          Aspartate aminotransferase [Enzymatic activity/volume]

 in Serum or Plasma 24 U/L

             15-37                                  [facility] ([facility]

)  

 

                    Alanine aminotransferase [Enzymatic activity/volume] in Seru

m or Plasma 37 U/L              

12-78                                           [facility] ([facility])  

 

           Albumin [Mass/volume] in Serum or Plasma 3.7 g/dL   3.4-5.0          

                [facility] ([facility])                              

 

           Proteinase 3 Ab [Units/volume] in Serum 7.2 g/dL   6.4-8.2           

               [facility]                              

 

                                        Glomerular filtration rate/1.73 sq M pre

dicted among non-blacks [Volume 

Rate/Area] in Serum or Plasma by Creatinine-based formula (MDRD) Laboratory test

result                                              [facility] ([facility]

)  

 

          A/G Ratio 1.06 CALC 1.00-1.90                     [facility] ([facility])  

 

                                        Glomerular filtration rate/1.73 sq M pre

dicted among blacks [Volume Rate/Area] 

in Serum or Plasma by Creatinine-based formula (MDRD) Laboratory test result    

                  

                                        [facility] ([facility])  

 

                                        <content>CHRONIC KIDNEY DISEASE STAGING 

PER 

[facility]</content><br/><content></content><br/><content>STAGE I & II      GFR >= 60  
     NORMAL TO MILDLY DECREASED</content><br/><content>STAGE III          GFR 
30-59          MODERATELY DECREASED</content><br/><content>STAGE IV           
GFR 15-29         SEVERELY DECREASED</content><br/><content>STAGE V            
GFR <15            VERY LITTLE GFR LEFT</content><br/><content>ESRD             
   GFR <15            ON RRT</content><br/><content></content> 









                    ID                  Date                Data Source

 

                    [id number]          2020 09:11:00 AM EST [facility] ([facility])









          Name      Value     Range     Interpretation Code Description Data Anitra

rce(s) Supporting 

Document(s)

 

          Magnesium 2.0 mg/dL 1.8-2.4                       [facility] ([facility])  









                    ID                  Date                Data Source

 

                    [id number]          2020 09:11:00 AM EST [facility] ([facility])









          Name      Value     Range     Interpretation Code Description Data Anitra

rce(s) Supporting 

Document(s)

 

           Hemoglobin A1c/Hemoglobin.total in Blood 6.6 %                       

                [facility] ([facility])                              

 

                                        Lab Result Notes:

Pre-Diabetes   5.7 - 6.4 %

Diabetes           = or > 6.5%

 

 

                          Glucose mean value [Mass/volume] in Blood Estimated fr

om glycated hemoglobin 143

 mg/dL                                        [facility] ([facility]

)  







                                        Procedure

 

                                          



                                                                                
                                                                                
                                                                                
                                                                    



Social History

          



           Code       Duration   Value      Status     Description Data Source(s

)

 

                Alcohol intake  10/06/2021 12:00:00 AM EDT Current drinker of al

cohol (finding) 

completed                 Current drinker of alcohol (finding) [facility]

 

             Smoking      2021 10:49:44 AM EDT Never smoked tobacco (findi

ng) completed    

Never smoked tobacco (finding)          [place] (Juan Manuel Perdomo MD [facility])

 

             Smoking      2021 01:20:00 PM EDT Never smoked tobacco (findi

ng) completed    

Never smoked tobacco (finding)          [place] (Juan Manuel Perdomo MD [facility])

 

           Alcohol intake 2021 12:00:00 AM EDT Yes        completed       

      [facility]

 

           Smoking    2021 12:00:00 AM EDT Never smoker completed  Never s

moker [facility]



                                                                                
                                                          



Vital Signs

          



                    ID                  Date                Data Source

 

                    UNK                                      









           Name       Value      Range      Interpretation Code Description Data

 Source(s)

 

           Systolic blood pressure 150 mm[Hg]                       150 mm[Hg] [facility]

 

           Diastolic blood pressure 80 mm[Hg]                        80 mm[Hg]  

[facility]

 

           Heart rate 70 /min                          70 /min    [facility]

 

           Body temperature 36.22 Corina                        36.22 Corina  [facility]

 

           Respiratory rate 16 /min                          16 /min    [facility]

 

           Body height 177.8 cm                         177.8 cm   [facility]

 

           Body weight 93.441 kg                        93.441 kg  [facility]

 

           Body mass index (BMI) [Ratio] 29.56 kg/m2                       29.56

 kg/m2 [facility]

 

           Oxygen saturation in Arterial blood by Pulse oximetry 98 %           

                  98 %       [facility]

 

           Body weight 238.00 [lb_av]                       238.00 [facility]_av[facility]

[facility] ([facility]

 

           Systolic blood pressure 130 mm[Hg]                       130 mm[Hg] M

[facility] ([facility])

 

           Diastolic blood pressure 70 mm[Hg]                        70 mm[Hg]  

[facility] ([facility])

 

           Body height 70 [in_i]                        70 [in_i]  [facility] ([facility])

 

                                        5'10" 

 

           Body mass index (BMI) [Ratio] 34.1 kg/m2                       34.1 k

g/m2 [facility] ([facility])

 

           Ideal body weight 166 [lb_av]                       166 [lb_av] [facility]

T ([facility])

 

           Body weight 107.957 kg                       107.957 kg [facility] ([facility])

 

           Body surface area Derived from formula 2.25 m2                       

   2.25 m2    [facility] ([facility], )

 

           Diastolic blood pressure 90 mm[Hg]                        90 mm[Hg]  

[facility] ([facility])

 

           Systolic blood pressure 138 mm[Hg]                       138 mm[Hg] M

[facility] ([facility])

 

           Heart rate 78 /min                          78 /min    [facility] ([facility])

 

           Body height 71 [in_i]                        71 [in_i]  [facility] ([facility]

 

                                        5'11" 

 

           Body weight 211.00 [lb_av]                       211.00 [lb_av] [facility]

T ([facility])

 

           Body mass index (BMI) [Ratio] 29.4 kg/m2                       29.4 k

g/m2 [facility] ([facility]

 

           Systolic blood pressure 130 mm[Hg]                       130 mm[Hg] [facility]

 

           Diastolic blood pressure 88 mm[Hg]                        88 mm[Hg]  

[facility]

 

           Heart rate 85 /min                          85 /min    [facility]

 

           Respiratory rate 18 /min                          18 /min    [facility]

 

           Body height 178.4 cm                         178.4 cm   [facility]

 

           Body weight 93.441 kg                        93.441 kg  [facility]

 

           Body mass index (BMI) [Ratio] 29.35 kg/m2                       29.35

 kg/m2 [facility]

 

           Body height 70 [in_i]                        70 [in_i]  [facility] ([facility], )

 

                                        5'10" 

 

           Body weight 205.25 [lb_av]                       205.25 [lb_av] [facility]

T ([facility], )

 

           Ideal body weight 166 [lb_av]                       166 [lb_av] [facility]

T ([facility], )

 

           Body mass index (BMI) [Ratio] 29.4 kg/m2                       29.4 k

g/m2 [facility] ([facility], )

 

           Body surface area Derived from formula 2.11 m2                       

   2.11 m2    [facility] ([facility], )

 

           Systolic blood pressure 157 mm[Hg]                       157 mm[Hg] M

[facility] ([facility], )

 

           Diastolic blood pressure 95 mm[Hg]                        95 mm[Hg]  

[facility] ([facility], )

 

           Body weight 93.101 kg                        93.101 kg  [facility] ([facility], )

 

           Body mass index (BMI) [Ratio] 29.0 kg/m2                       29.0 k

g/m2 [facility] ([facility]

 

           Diastolic blood pressure 80 mm[Hg]                        80 mm[Hg]  

[facility] ([facility])

 

           Systolic blood pressure 142 mm[Hg]                       142 mm[Hg] M

[facility] ([facility])

 

           Systolic blood pressure 135 mm[Hg]                       135 mm[Hg] [facility] ([facility])

 

           Diastolic blood pressure 70 mm[Hg]                        70 mm[Hg]  

[facility] ([facility])

 

           Heart rate 78 /min                          78 /min    [facility] ([facility])

 

           Body height 71 [in_i]                        71 [in_i]  [facility] ([facility]

 

                                        5'11" 

 

           Body weight 208.00 [lb_av]                       208.00 [lb_av] [facility]

[facility] ([facility])



                                                                                
                  



Patient Treatment Plan of Care

          



             Planned Activity Planned Date Details      Description  Data Source

(s)

 

             Methimazole 5 MG Oral Tablet 10/06/2021 12:00:00 AM EDT            

               [facility]

 

             Methimazole 5 MG Oral Tablet 2021 12:00:00 AM EDT            

               [facility]

 

             telmisartan 80 MG Oral Tablet 2021 12:00:00 AM EDT           

                [facility]

 

                          Dexamethasone 1 MG/ML / Tobramycin 3 MG/ML Ophthalmic 

Suspension 2021 

12:00:00 AM EDT                                             [facility]

 

             telmisartan 80 MG Oral Tablet 2021 12:00:00 AM EDT           

                [facility]

 

                          24 HR metoprolol succinate 25 MG Extended Release Oral

 Tablet 2021 

12:00:00 AM EST                                             [facility]

 

             Amlodipine 10 MG Oral Tablet 2021 12:00:00 AM EST            

               [facility]

 

             glucose blood (ONE TOUCH ULTRA TEST) test strip 12/15/2020 12:00:00

 AM EST                           

[facility]

 

             atorvastatin 10 MG Oral Tablet 12/15/2020 12:00:00 AM EST          

                 [facility]

 

             Lancets 30G MISC 12/15/2020 12:00:00 AM EST                        

   [facility]

 

                          Semaglutide,0.25 or 0.5MG/DOS, (OZEMPIC, 0.25 OR 0.5 M

G/DOSE,) 2 MG/1.5ML SOPN 

2020 12:00:00 AM EST                                         [facility]

 

             Methimazole 5 MG Oral Tablet 2020 12:00:00 AM EDT            

               [facility]

 

             tadalafil 10 MG Oral Tablet 2019 12:00:00 AM EST             

              [facility]

## 2021-11-11 NOTE — CCD
Continuity of Care Document (CCD)

                             Created on: 2021



Tonny Prieto

External Reference #: [id number]

: 1960

Sex: Male



Demographics





                          Address                   [address] [place] [address] 

[place]  [address]

 

                          Home Phone                [phone]

 

                          Preferred Language        Unknown

 

                          Marital Status            Unknown

 

                          Voodoo Affiliation     Unknown

 

                          Race                      Black or 

 

                          Ethnic Group              Not  or 





Author





                          Author                    Tonny SYLVESTER [facility]

 

                          Organization              Unknown

 

                          Address                   [address], [address]

[place]  [address]



 

                          Phone                     [phone]







Care Team Providers





                    Care Team Member Name Role                Phone

 

                    David Ness M.D.   AUTM                [phone]







Problems





                    Active Problems     Provider            Date

 

                    Essential hypertension Leo J. Gosselin JR MD Onset: 20

20







Social History





                Type            Date            Description     Comments

 

                Birth Sex                       Unknown          

 

                ETOH Use                        2 A Day          

 

                Tobacco Use     Start: Unknown  Denies Smoking   

 

                Recreational Drug Use                 Denies Drug Use  







Allergies, Adverse Reactions, Alerts





                                        Description

 

                                        No Known Drug Allergies







Medications





           Active Medications SIG        Qnty       Indications Ordering Provide

r Date

 

           Telmisartan                     80mg Tablets                   1 qd  

                           Unknown    



 

                          Methimazole                     10mg Tablets          

         1 tab on Monday, 

Wednesday & Friday                                 Unknown         

 

             Amlodipine Besylate                     10mg Tablets               

    1 qd                                   

Unknown                                 

 

             Hydrochlorothiazide                     25mg Tablets               

    1 qd                                   

Unknown                                 

 

                                        Metoprolol Succinate ER                 

    25mg Tablets ER 24HR                

             1 qd                                   Unknown      

 

                Atorvastatin Calcium                     10mg Tablets           

        1 qd                             

                          Unknown                   

 

                                        Ozempic (0.25 Or 0.5 MG/Dose)           

          2mg/1.5ML Solution Pen-Inject 

                 0.5 MG once a week                           Unknown      







Immunizations





                                        Description

 

                                        No Information Available







Vital Signs





                Date            Vital           Result          Comment

 

                2021 10:03am BP Systolic     130 mmHg         

 

                    BP Diastolic        70 mmHg              

 

                    Height              70 inches           5'10"

 

                    Weight              238.00 lb            

 

                    BMI (Body Mass Index) 34.1 kg/m2           

 

                    Ideal Body Weight   166 lb               

 

                    Weight              107.957 kg           

 

                    BSA (Body Surface Area) 2.25 m2              

 

                2020 10:25am BP Systolic     157 mmHg         

 

                    BP Diastolic        95 mmHg              

 

                    Height              70 inches           5'10"

 

                    Weight              205.25 lb            

 

                    BMI (Body Mass Index) 29.4 kg/m2           

 

                    Ideal Body Weight   166 lb               

 

                    Weight              93.101 kg            

 

                    BSA (Body Surface Area) 2.11 m2              







Results





                                        Description

 

                                        No Information Available







Procedures





                Date            Code            Description     Status

 

                2021      81911           Office/Outpatient Established Lo

w MDM 20-29 Min Completed







[employer]





                                        Description

 

                                        No Information Available







Encounters





           Type       Date       Location   Provider   Dx         Diagnosis

 

             Office Visit 2021 10:15a [facility] SIMIN William 

Z86.010                                 Personal history of colonic polyps







Assessments





                Date            Code            Description     Provider

 

                2021      Z86.010         Personal history of colonic poly

ps SIMIN Schultz







Plan of Treatment





No Information Available



Functional Status





                                        Description

 

                                        No Information Available







Mental Status





                                        Description

 

                                        No Information Available







Referrals





                                        Description

 

                                        No Information Available

## 2021-11-11 NOTE — CCD
Continuity of Care Document (CCD)

                             Created on: 10/05/2021



Tonny Prieto

External Reference #: [id number]

: 1960

Sex: Male



Demographics





                          Address                   [address] [place] [address] [place]  [address]

 

                          Home Phone                [phone]

 

                          Preferred Language        Unknown

 

                          Marital Status            

 

                          Shinto Affiliation     Unknown

 

                          Race                      Black or 

 

                          Ethnic Group              Declined to Specify/Unknown





Author





                          Organization              Unknown

 

                          Address                   Unknown

 

                          Phone                     Unavailable







Care Team Providers





                    Care Team Member Name Role                Phone

 

                    David Ness MD   AUTM                [phone]

 

                    Leslie Carter AUTM                [phone]







Problems





                    Active Problems     Provider            Date

 

                    Insulin treated type 2 diabetes mellitus PAYTON Rae  

 Onset: 2016

 

                    Essential hypertension PAYTON Rae   Onset: 2016

 

                    Gastroesophageal reflux disease PAYTON Rae   Onset: 0

2016

 

                    Secondary erectile dysfunction David Ness M.D. Onset: 0

2016







Social History





                Type            Date            Description     Comments

 

                Birth Sex                       Unknown          

 

                ETOH Use                        Rarely consumes alcohol  

 

                Tobacco Use     Start: Unknown  Patient has never smoked  







Allergies and adverse reactions





                                        Description

 

                                        No Known Drug Allergies







Medications





           Active Medications SIG        Qnty       Indications Ordering Provide

r Date

 

                          Hydrochlorothiazide                     25mg Tablets  

                 1 by 

mouth every day 90tabs                          David Ness M.D. 2020

 

                                        Ozempic                     0.25or 0.5 m

g/Dose Solution Pen-Inject              

             inject 0.5 mg weekly 3ml                       David Ness M.D.

 2019

 

                          Tapazole                     10mg Tablets             

      po 1/2 tab daily 

(takes M, W, F) - per Dr PLUNKETT (Endocrine) 90tabs                          David Ness M.D. 2018

 

                                        Metoprolol Succinate ER                 

    25mg Tablets ER 24HR                

             1 by mouth every day 90tabs                    David Ness M.D.

 2017

 

                    Norvasc                     10mg Tablets                   1

 by mouth every day 

90tabs                                  David Ness M.D. 2016

 

                          Lancets 30G                     30G Misc              

     test twice a day and 

as needed e11.9 100units        E11.9           David Ness M.D. 2016

 

                          Pen Needles                     31G X 6 mm Misc       

            as directed 

every day       100units        E11.9           David Ness M.D. 2016

 

                          Onetouch Ultra Blue                      Strips       

            test every day

and as needed   100units        E11.9           David Ness M.D. 2016

 

             Micardis                     80mg Tablets                   1 daily

      30tabs                    

David Ness M.D.                    







Immunizations





                                        Description

 

                                        No Information Available







Vital Signs





                Date            Vital           Result          Comment

 

                04/15/2021 12:01pm BP Systolic     138 mmHg         

 

                    BP Diastolic        90 mmHg              

 

                    Heart Rate          78 /min              

 

                    Height              71 inches           5'11"

 

                    Weight              211.00 lb            

 

                    BMI (Body Mass Index) 29.4 kg/m2           

 

                2020  3:16pm BP Systolic     142 mmHg         

 

                    BP Diastolic        80 mmHg              

 

                    BP Systolic Recheck 135 mmHg             

 

                    BP Diastolic Recheck 70 mmHg              

 

                    Heart Rate          78 /min              

 

                    Height              71 inches           5'11"

 

                    Weight              208.00 lb            

 

                    BMI (Body Mass Index) 29.0 kg/m2           







Results





        Test    Acquired Date Facility Test    Result  H/L     Range   Note

 

                    Hemoglobin A1c      10/05/2021          [address] [id number] [phone] Hemoglobin A1c 6.7 %      Normal                1

 

             Estimated Average Glucose 146 mg/dL    High                 

 

                    Lipid Panel         10/05/2021          [address] [id number] [phone] Triglycerides Level 107 mg/dL  Normal     <150        

 

             Cholesterol Level 166 mg/dL    Normal       <200          

 

             HDL Cholesterol 69 mg/dL     Normal       >40           

 

             LDL Cholesterol 76 mg/dL     Normal       <100          

 

             Non-HDL-C    97 mg/dL     Normal                     

 

             Cholesterol Risk Ratio 2.405        Normal       <5            

 

                    FT4&TSH Panel       10/05/2021          [address] [id number] [phone] Thyroid Stimulating Hormone 1.300 uIU/ML Normal     0.

358-3.740  

 

             Free T4      1.07 ng/dL   Normal       0.76-1.46     

 

                    A1c                 2021          [facility]

, pc

: Dr Fredrick Harmon

[place] [address]

           [phone] Hba1c      7.0 %      High       <5.7       2

 

             Est Avg Glucose 154 mg/dL    High         60 - 110      

 

                    Comprehensive Chem Profile 2021          [facility]

[facility]

: Dr Fredrick Harmon

[place] [address] [phone] Glucose    147 mg/dL  High       74 - 99    3

 

             BUN          11 mg/dL                  7 - 18        

 

             Creatinine   1.2 mg/dL                 0.6 - 1.3     

 

             Sodium       136 mEq/L                 136 - 145     

 

             Potassium    3.7 mEq/L                 3.5 - 5.1     

 

             Chloride     98 mEq/L                  98 - 107      

 

             Carbon Dioxide 27 mEq/L                  21 - 32       

 

             Calcium      9.2 mg/dL                 8.5 - 10.1    

 

             Alk. Phosphatase 83 mg/dL                  46 - 116      

 

             Total Bilirubin 1.1 mg/dL    High         0.2 - 1.0     

 

             Ast (Sgot)   23 U/L                    15 - 37       

 

             Alt (SGPT)   36 U/L                    12 - 78       

 

             Albumin      3.6 g/dL                  3.4 - 5.0     

 

             Total Protein 7.2 g/dL                  6.4 - 8.2     

 

             A/G Ratio    1.00 CALC                 1.00 - 1.90   

 

             GFR  >= 60 mL/min              >60           

 

             GFR African American >= 60 mL/min              >60          4

 

                    Lipid Profile       2021          [facility]

, pc

: Dr Fredrick [place]

[place] [address] [phone] Cholesterol 172 mg/dL             131 - 200   

 

             Triglycerides 189 mg/dL    High         30 - 150      

 

             HDL Cholesterol 68 mg/dL     High         35 - 60       

 

             LDL (Calculated) 66 CALC                   50 - 159      







                          1                         REFERENCE RANGES:



<=5.6%    NORMAL

                                        5.7-6.4%  SUGGESTS IMPAIRED GLUCOSE META

BOLISM/PREDIABETIC

>= 6.5%  ABNORMAL



 

                          2                         Lab Result Notes:

Pre-Diabetes   5.7 - 6.4 %

Diabetes           = or > 6.5%



 

                          3                         100-125 mg/dL     PRE-DIABET

ES/FASTING

>126 mg/dL          DIABETES/FASTING



 

                          4                         CHRONIC KIDNEY DISEASE STAGI

NG PER NKF



STAGE I & II      GFR >= 60        NORMAL TO MILDLY DECREASED

STAGE III          GFR 30-59          MODERATELY DECREASED

STAGE IV           GFR 15-29         SEVERELY DECREASED

STAGE V            GFR <15            VERY LITTLE GFR LEFT

ESRD                 GFR <15            ON RRT









Procedures





                Date            Code            Description     Status

 

                04/15/2021      58970           Office/Outpatient Established Lo

w MDM 20-29 Min Completed

 

                2018      [id number]       Diabetic Retinal Eye Exam Comple

[facility]

 

                2016      [id number]        Colonoscopy     Completed







[employer]





                                        Description

 

                                        No Information Available







Encounters





           Type       Date       Location   Provider   Dx         Diagnosis

 

             Office Visit 04/15/2021 11:45a [facility] David Ness M.D. 

H53.9                                   Unspecified visual disturbance

 

                          E07.9                     Disorder of thyroid, unspeci

fied

 

                          I10                       Essential (primary) hyperten

jonas

 

                          E11.9                     Type 2 diabetes mellitus wit

hout complications

 

                          Z86.010                   Personal history of colonic 

polyps

 

                          Z85.46                    Personal history of malignan

t neoplasm of prostate







Assessments





                Date            Code            Description     Provider

 

                2021      E11.9           Type 2 diabetes mellitus without

 complications David Ness M.D.

 

                2021      E11.9           Type 2 diabetes mellitus without

 complications Lab Schedule

 

                2021      E78.00          Pure hypercholesterolemia, unspe

cified David Ness M.D.

 

                2021      E78.00          Pure hypercholesterolemia, unspe

cified Lab Schedule

 

                04/15/2021      H53.9           Unspecified visual disturbance BHARGAVI Ness M.D.

 

                04/15/2021      E07.9           Disorder of thyroid, unspecified

 David Ness M.D.

 

                04/15/2021      I10             Essential (primary) hypertension

 David Ness M.D.

 

                04/15/2021      E11.9           Type 2 diabetes mellitus without

 complications David Ness M.D.

 

                04/15/2021      Z86.010         Personal history of colonic poly

ps David Ness M.D.

 

                04/15/2021      Z85.46          Personal history of malignant ne

oplasm of prostate David Ness M.D.







Plan of Treatment

Future Appointment(s):* 2021 11:30 am - David Ness M.D. at [facility]

2020 - David Ness M.D.* E78.00 Pure hypercholesterolemia, unspecified

* I10 Essential (primary) hypertension

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

* E07.9 Disorder of thyroid, unspecified

* Z85.46 Personal history of malignant neoplasm of prostate

* Z86.010 Personal history of colonic polyps

* * Comments:* 1.  Hypercholesterolemia:  We do await labs today.  Lipids were 
  good at Dr. Carter's office.  We will monitor.2.  Hypertension:  Blood 
  pressure was high initially without any associated symptoms and on recheck was
  stable.  We will continue current regimen and monitor.3.  Type 2 diabetes 
  mellitus with diabetic nephropathy:  Sees Dr. Carter.4.  Personal 
  history of malignant neoplasm of prostate:  Sees Dr Huitron of [facility] in 
  [place].5.  Disorder of thyroid:  We do await labs today.  I did review his 
  TSH level from Dr. Carter's which was under good control.  Patient is 
  on Tapazole, will continue and monitor.6.  Personal history of colonic polyps:
   His last colonoscopy was in May 2016 by Dr. Gosselin which showed presence of
  total 2 polyps and has 5 year follow up.  Patient will follow up with Dr. Gosselin appropriately.Ongoing cares:  We will obtain EKG on the way out.  He 
  will be getting Holter monitor and will follow up promptly for results.  I am 
  going to see him again in 6 months with CMP, lipids, TSH and HgbA1c. If he has
  new problems or issues sooner he will let us know.









Functional Status





                                        Description

 

                                        No Information Available







Mental Status





                                        Description

 

                                        No Information Available







Referrals





                Refer to      Reason for Referral Status          Appt Date

 

                Jax Ledbetter  DO CONSULT FOR SWOLLEN EYELIDS, BLURRY VISION

  Created         



 

                                        [facility]

 

                                        [address] [place] [address],[address]

 

                                        [place]  [address]

 

                                        [phone]

## 2021-11-11 NOTE — ROOR
________________________________________________________________________________

Patient Name: Tonny Prieto            Procedure Date: 11/11/2021 9:08 AM

MRN: [id number]                          Account Number: [id number]

YOB: 1960               Age: 61

Room: [facility]                            Gender: Male

Note Status: Finalized                 

________________________________________________________________________________

 

Procedure:            Colonoscopy

Indications:          High risk colon cancer surveillance: Personal history of 

                      colonic polyps

Providers:            Leo J. Gosselin Jr, MD

Referring MD:         David Ness MD

Requesting Provider:  

Medicines:            Propofol per Anesthesia

Complications:        No immediate complications.

________________________________________________________________________________

Procedure:            Pre-Anesthesia Assessment:

                      - Prior to the procedure, a History and Physical was 

                      performed, and patient medications and allergies were 

                      reviewed. The patient is competent. The risks and 

                      benefits of the procedure and the sedation options and 

                      risks were discussed with the patient. All questions 

                      were answered and informed consent was obtained. Patient 

                      identification and proposed procedure were verified by 

                      the physician and the nurse in the pre-procedure area 

                      and in the procedure room. Mental Status Examination: 

                      alert and oriented. Airway Examination: normal 

                      oropharyngeal airway and neck mobility. Respiratory 

                      Examination: clear to auscultation. CV Examination: 

                      normal. ASA Grade Assessment: II - A patient with mild 

                      systemic disease. After reviewing the risks and 

                      benefits, the patient was deemed in satisfactory 

                      condition to undergo the procedure. The anesthesia plan 

                      was to use moderate sedation / analgesia (conscious 

                      sedation). Immediately prior to administration of 

                      medications, the patient was re-assessed for adequacy to 

                      receive sedatives. The heart rate, respiratory rate, 

                      oxygen saturations, blood pressure, adequacy of 

                      pulmonary ventilation, and response to care were 

                      monitored throughout the procedure. The physical status 

                      of the patient was re-assessed after the procedure.

                      The Colonoscope was introduced through the anus and 

                      advanced to the cecum, identified by appendiceal orifice 

                      and ileocecal valve. The colonoscopy was performed 

                      without difficulty. The patient tolerated the procedure 

                      well. The quality of the bowel preparation was adequate.

                                                                                

Findings:

     Two polyps were found in the transverse colon and ascending colon. The 

     polyps were small in size. These polyps were removed with a jumbo cold 

     forceps. Resection and retrieval were complete.

     The rectum, recto-sigmoid colon, descending colon, cecum, appendiceal 

     orifice and ileocecal valve appeared normal.

     Multiple small and large-mouthed diverticula were found in the sigmoid 

     colon.

                                                                                

Impression:           - Two small polyps in the transverse colon and in the 

                      ascending colon, removed with a jumbo cold forceps. 

                      Resected and retrieved.

                      - The rectum, recto-sigmoid colon, descending colon, 

                      cecum, appendiceal orifice and ileocecal valve are 

                      normal.

                      - Diverticulosis in the sigmoid colon.

Recommendation:       - Discharge patient to home (ambulatory).

                      - Repeat colonoscopy in 5 years for surveillance.

                                                                                

Procedure Code(s):    --- Professional ---

                      65164, Colonoscopy, flexible; with biopsy, single or 

                      multiple

Diagnosis Code(s):    --- Professional ---

                      Z86.010, Personal history of colonic polyps

                      K63.5, Polyp of colon

                      K57.30, Diverticulosis of large intestine without 

                      perforation or abscess without bleeding

 

CPT copyright 2019 [employer]. All rights reserved.

 

The codes documented in this report are preliminary and upon  review may 

be revised to meet current compliance requirements.

 

Leo Gosselin MD

_____________________

Leo J Gosselin Jr, MD

11/11/2021 9:32:14 AM

Electronically signed by Leo Gosselin Jr , MD

Number of Addenda: 0

 

Note Initiated On: 11/11/2021 9:08 AM

Estimated Blood Loss: Estimated blood loss: none.

## 2021-11-11 NOTE — CCD
Continuity of Care Document (CCD)

                             Created on: 2021



Conner Tonny O

External Reference #: [id number]

: 1960

Sex: Male



Demographics





                          Address                   [id number] [place] [address]  [address]

 

                          Home Phone                [phone]

 

                          Preferred Language        Unknown

 

                          Marital Status            

 

                          Rastafarian Affiliation     Unknown

 

                          Race                      Black or 

 

                          Ethnic Group              Declined to Specify/Unknown





Author





                          Author                    Lab Schedule, Tonny BRADLEY

 

                          Organization              Unknown

 

                          Address                   [address] [place]  [address]



 

                          Phone                     Unavailable







Care Team Providers





                    Care Team Member Name Role                Phone

 

                    David Ness MD   AUTM                [phone]

 

                    Leslie Carter AUTM                [phone]







Problems





                    Active Problems     Provider            Date

 

                    Insulin treated type 2 diabetes mellitus PAYTON Rae  

 Onset: 2016

 

                    Essential hypertension PAYTON Rae   Onset: 2016

 

                    Gastroesophageal reflux disease PAYTON Rae   Onset: 0

2016

 

                    Secondary erectile dysfunction David Ness M.D. Onset: 0

2016







Social History





                Type            Date            Description     Comments

 

                Birth Sex                       Unknown          

 

                ETOH Use                        Rarely consumes alcohol  

 

                Tobacco Use     Start: Unknown  Patient has never smoked  







Allergies, Adverse Reactions, Alerts





                                        Description

 

                                        No Known Drug Allergies







Medications





           Active Medications SIG        Qnty       Indications Ordering Provide

r Date

 

                          Hydrochlorothiazide                     25mg Tablets  

                 1 by 

mouth every day 90tarenny Ness M.D. 2020

 

                                        Ozempic                     0.25or 0.5 m

g/Dose Solution Pen-Inject              

             inject 0.5 mg weekly 3ml                       David Ness M.D.

 2019

 

                          Tapazole                     10mg Tablets             

      po 1/2 tab daily 

(takes M, W, F) - per Dr PLUNKETT (Endocrine) 90tarenny Ness M.D. 2018

 

                                        Metoprolol Succinate ER                 

    25mg Tablets ER 24HR                

             1 by mouth every day 90linda Ness M.D.

 2017

 

                    Norvasc                     10mg Tablets                   1

 by mouth every day 

90tarenny Ness M.D. 2016

 

                          Lancets 30G                     30G Misc              

     test twice a day and 

as needed e11.9 100units        E11.9           David Ness M.D. 2016

 

                          Pen Needles                     31G X 6 mm Misc       

            as directed 

every day       100units        E11.9           David Ness M.D. 2016

 

                          Onetouch Ultra Blue                      Strips       

            test every day

and as needed   100units        E11.9           David Ness M.D. 2016

 

             Micardis                     80mg Tablets                   1 daily

      30tabs                    

David Ness M.D.                    

 

                                        History Medications

 

                                        Tobramycin-Dexamethasone                

     0.3-0.1% Suspension                

             1 drop affected eye three times a day x 7 days 2.500ml             

      David Ness M.D. 

2021 - 04/15/2021







Immunizations





                                        Description

 

                                        No Information Available







Vital Signs





                Date            Vital           Result          Comment

 

                04/15/2021 12:01pm BP Systolic     138 mmHg         

 

                    BP Diastolic        90 mmHg              

 

                    Heart Rate          78 /min              

 

                    Height              71 inches           5'11"

 

                    Weight              211.00 lb            

 

                    BMI (Body Mass Index) 29.4 kg/m2           

 

                2020  3:16pm BP Systolic     142 mmHg         

 

                    BP Diastolic        80 mmHg              

 

                    BP Systolic Recheck 135 mmHg             

 

                    BP Diastolic Recheck 70 mmHg              

 

                    Heart Rate          78 /min              

 

                    Height              71 inches           5'11"

 

                    Weight              208.00 lb            

 

                    BMI (Body Mass Index) 29.0 kg/m2           







Results





        Test    Acquired Date Facility Test    Result  H/L     Range   Note

 

                    A1c                 2021          [facility]

, pc

: Dr Fredrick Harmon

[place], [place] [address] [phone] Hba1c      7.0 %      High       <5.7       1

 

             Est Avg Glucose 154 mg/dL    High         60 - 110      

 

                    Comprehensive Chem Profile 2021          [facility]

: [place] [address] [phone] Glucose    147 mg/dL  High       74 - 99    2

 

             BUN          11 mg/dL                  7 - 18        

 

             Creatinine   1.2 mg/dL                 0.6 - 1.3     

 

             Sodium       136 mEq/L                 136 - 145     

 

             Potassium    3.7 mEq/L                 3.5 - 5.1     

 

             Chloride     98 mEq/L                  98 - 107      

 

             Carbon Dioxide 27 mEq/L                  21 - 32       

 

             Calcium      9.2 mg/dL                 8.5 - 10.1    

 

             Alk. Phosphatase 83 mg/dL                  46 - 116      

 

             Total Bilirubin 1.1 mg/dL    High         0.2 - 1.0     

 

             Ast (Sgot)   23 U/L                    15 - 37       

 

             Alt (SGPT)   36 U/L                    12 - 78       

 

             Albumin      3.6 g/dL                  3.4 - 5.0     

 

             Total Protein 7.2 g/dL                  6.4 - 8.2     

 

             A/G Ratio    1.00 CALC                 1.00 - 1.90   

 

             GFR  >= 60 mL/min              >60           

 

             GFR African American >= 60 mL/min              >60          3

 

                    Lipid Profile       2021          [facility]

, pc

: Dr Fredrick Harmon

[place] [address]

           [phone] Cholesterol 172 mg/dL             131 - 200   

 

             Triglycerides 189 mg/dL    High         30 - 150      

 

             HDL Cholesterol 68 mg/dL     High         35 - 60       

 

             LDL (Calculated) 66 CALC                   50 - 159      

 

                    Basic Metabolic Profile 2021          [address] [address] [phone] Glucose, Fasting 132 mg/dL  High             

 

             Blood Urea Nitrogen 10 mg/dL     Normal       7-18          

 

             Creatinine For GFR 1.15 mg/dL   Normal       0.70-1.30     

 

             Glomerular Filtration Rate > 60.0       Normal       >49          4

 

             Sodium Level 135 mEq/L    Low          136-145       

 

             Potassium Serum 3.8 mEq/L    Normal       3.5-5.1       

 

             Chloride Level 101 mEq/L    Normal               

 

             Carbon Dioxide Level 27 mEq/L     Normal       21-32         

 

             Anion Gap    7 mEq/L      Low          8-16          

 

             Calcium Level 9.2 mg/dL    Normal       8.8-10.2      

 

                    Laboratory test finding 2021          [facility]

                                        [address] [place] [address] [phone] PSA Screening 0.45 NG/ML Normal     < 4.00     5

 

                    FT4&TSH Panel       2021          [facility]

nter

                                        [address] [place] [address] [phone] Thyroid Stimulating Hormone 1.740 uIU/ML Normal     0.

358-3.740  

 

             Free T4      0.97 ng/dL   Normal       0.76-1.46     

 

                    Laboratory test finding 2021          [address], [place] [address] [phone] Free T3    2.6 pg/mL  Normal     2.2-4.0     

 

                    Microalbumin Random 2021          [facility]

nter

                                        [address] [place] [address] [phone] Creatinine, Urine 110.0 mg/dL Normal                 

 

             Malb Urine Siemens 26.0 mg/L    Normal                     

 

             Monster/Creat Ratio 23.6 MCG/MG  Normal       0.0-30.0     6







                          1                         Lab Result Notes:

Pre-Diabetes   5.7 - 6.4 %

Diabetes           = or > 6.5%



 

                          2                         100-125 mg/dL     PRE-DIABET

ES/FASTING

>126 mg/dL          DIABETES/FASTING



 

                          3                         CHRONIC KIDNEY DISEASE STAGI

NG PER NKF



STAGE I & II      GFR >= 60        NORMAL TO MILDLY DECREASED

STAGE III          GFR 30-59          MODERATELY DECREASED

STAGE IV           GFR 15-29         SEVERELY DECREASED

STAGE V            GFR <15            VERY LITTLE GFR LEFT

ESRD                 GFR <15            ON RRT



 

                          4                         Units are mL/min/1.73 m2



Chronic Kidney Disease Staging per NKF:



Stage I & II   GFR >=60       Normal to Mildly Decreased

Stage III      GFR 30-59      Moderately Decreased

Stage IV       GFR 15-29      Severely Decreased

Stage V        GFR <15        Very Little GFR Left

ESRD           GFR <15 on RRT



 

                          5                         The PSA assay is performed o

n the Siemens Vista analyzer by

LOCI sandwich chemiluminescent immunoassay and should not be

compared interchangeably with other methods.  It should not

be used alone as a screening test or diagnosis for the

presence or absence of malignant disease.  Predictions of

disease recurrence should not be based solely on values

obtained from serial patient serum values.



 

                          6                         THE AMERICAN DIABETES ASSOCI

ATION STATES THAT

MICROALBUMINURIA IS PRESENT IF THE MICROALBUMIN/CREATININE

RATIO EXCEEDS 30 MCG/MG.  THE THRESHOLD FOR CLINICAL

ALBUMINURIA IS REACHED  MCG/MG.  THE CLASSIFICATION OF

A PATIENT SHOULD BE BASED UPON AT LEAST 2 OF 3 ABNORMAL

RESULTS ON SPECIMENS COLLECTED WITHIN A 3 TO 6 MONTH TIME

FRAME.









Procedures





                Date            Code            Description     Status

 

                04/15/2021      43331           Office/Outpatient Established Lo

w MDM 20-29 Min Completed

 

                2018      [id number]       Diabetic Retinal Eye Exam Comple

hira

 

                2016      [id number]        Colonoscopy     Completed







Medical Devices





                                        Description

 

                                        No Information Available







Encounters





           Type       Date       Location   Provider   Dx         Diagnosis

 

             Office Visit 04/15/2021 11:45a [facility] David Ness M.D. 

H53.9                                   Unspecified visual disturbance

 

                          E07.9                     Disorder of thyroid, unspeci

fied

 

                          I10                       Essential (primary) hyperten

jonas

 

                          E11.9                     Type 2 diabetes mellitus wit

hout complications

 

                          Z86.010                   Personal history of colonic 

polyps

 

                          Z85.46                    Personal history of malignan

t neoplasm of prostate







Assessments





                Date            Code            Description     Provider

 

                2021      E11.9           Type 2 diabetes mellitus without

 complications David Ness M.D.

 

                2021      E11.9           Type 2 diabetes mellitus without

 complications Lab Schedule

 

                2021      E78.00          Pure hypercholesterolemia, unspe

cified David Ness M.D.

 

                2021      E78.00          Pure hypercholesterolemia, unspe

cified Lab Schedule

 

                04/15/2021      H53.9           Unspecified visual disturbance BHARGAVI Ness M.D.

 

                04/15/2021      E07.9           Disorder of thyroid, unspecified

 David Ness M.D.

 

                04/15/2021      I10             Essential (primary) hypertension

 David Ness M.D.

 

                04/15/2021      E11.9           Type 2 diabetes mellitus without

 complications David Ness M.D.

 

                04/15/2021      Z86.010         Personal history of colonic poly

ps David Ness M.D.

 

                04/15/2021      Z85.46          Personal history of malignant ne

oplasm of prostate David Ness M.D.







Plan of Treatment

Future Appointment(s):* 2021 11:30 am - David Ness M.D. at [facility]

2020 - David Ness M.D.* E78.00 Pure hypercholesterolemia, unspecified

* I10 Essential (primary) hypertension

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

* E07.9 Disorder of thyroid, unspecified

* Z85.46 Personal history of malignant neoplasm of prostate

* Z86.010 Personal history of colonic polyps

* * Comments:* 1.  Hypercholesterolemia:  We do await labs today.  Lipids were 
  good at Dr. Carter's office.  We will monitor.2.  Hypertension:  Blood 
  pressure was high initially without any associated symptoms and on recheck was
  stable.  We will continue current regimen and monitor.3.  Type 2 diabetes 
  mellitus with diabetic nephropathy:  Sees Dr. Carter.4.  Personal 
  history of malignant neoplasm of prostate:  Sees Dr Huitron of [facility] in 
  [place].5.  Disorder of thyroid:  We do await labs today.  I did review his 
  TSH level from Dr. Carter's which was under good control.  Patient is 
  on Tapazole, will continue and monitor.6.  Personal history of colonic polyps:
   His last colonoscopy was in May 2016 by Dr. Gosselin which showed presence of
  total 2 polyps and has 5 year follow up.  Patient will follow up with Dr. Gosselin appropriately.Ongoing cares:  We will obtain EKG on the way out.  He 
  will be getting Holter monitor and will follow up promptly for results.  I am 
  going to see him again in 6 months with CMP, lipids, TSH and HgbA1c. If he has
  new problems or issues sooner he will let us know.









Functional Status





                                        Description

 

                                        No Information Available







Mental Status





                                        Description

 

                                        No Information Available







Referrals





                Refer to      Reason for Referral Status          Appt Date

 

                Jax Ledbetter  DO CONSULT FOR SWOLLEN EYELIDS, BLURRY VISION

  Created         



 

                                        [facility]

 

                                        [address] [place] [address],[address]

 

                                        [place]  [address] [phone]

## 2023-03-14 ENCOUNTER — HOSPITAL ENCOUNTER (OUTPATIENT)
Dept: HOSPITAL 53 - M LAB | Age: 63
End: 2023-03-14
Attending: INTERNAL MEDICINE
Payer: COMMERCIAL

## 2023-03-14 DIAGNOSIS — E11.9: Primary | ICD-10-CM

## 2023-03-14 DIAGNOSIS — E78.00: ICD-10-CM

## 2023-03-14 DIAGNOSIS — E05.00: ICD-10-CM

## 2023-03-14 LAB
BUN SERPL-MCNC: 13 MG/DL (ref 9–23)
CALCIUM SERPL-MCNC: 9.2 MG/DL (ref 8.3–10.6)
CHLORIDE SERPL-SCNC: 101 MMOL/L (ref 98–107)
CHOLEST SERPL-MCNC: 142 MG/DL (ref ?–200)
CHOLEST/HDLC SERPL: 2.8 {RATIO} (ref ?–5)
CO2 SERPL-SCNC: 26 MMOL/L (ref 20–31)
CREAT SERPL-MCNC: 1.02 MG/DL (ref 0.7–1.3)
EST. AVERAGE GLUCOSE BLD GHB EST-MCNC: 174 MG/DL (ref 60–110)
GFR SERPL CREATININE-BSD FRML MDRD: > 60 ML/MIN/{1.73_M2} (ref 49–?)
GLUCOSE SERPL-MCNC: 161 MG/DL (ref 74–106)
HDLC SERPL-MCNC: 50.6 MG/DL (ref 40–?)
LDLC SERPL CALC-MCNC: 58.2 MG/DL (ref ?–100)
NONHDLC SERPL-MCNC: 91.4 MG/DL
POTASSIUM SERPL-SCNC: 3.8 MMOL/L (ref 3.5–5.1)
SODIUM SERPL-SCNC: 135 MMOL/L (ref 136–145)
TRIGL SERPL-MCNC: 166 MG/DL (ref ?–150)
TSH SERPL DL<=0.005 MIU/L-ACNC: 2.48 UIU/ML (ref 0.55–4.78)

## 2023-04-21 ENCOUNTER — HOSPITAL ENCOUNTER (OUTPATIENT)
Dept: HOSPITAL 53 - M WUC | Age: 63
End: 2023-04-21
Attending: FAMILY MEDICINE
Payer: COMMERCIAL

## 2023-04-21 DIAGNOSIS — R05.9: Primary | ICD-10-CM

## 2023-07-16 ENCOUNTER — HOSPITAL ENCOUNTER (OUTPATIENT)
Dept: HOSPITAL 53 - M LAB | Age: 63
End: 2023-07-16
Payer: COMMERCIAL

## 2023-07-16 DIAGNOSIS — E05.00: Primary | ICD-10-CM

## 2023-07-16 DIAGNOSIS — E11.9: ICD-10-CM

## 2023-07-16 LAB
BUN SERPL-MCNC: 10 MG/DL (ref 9–23)
CALCIUM SERPL-MCNC: 9 MG/DL (ref 8.3–10.6)
CHLORIDE SERPL-SCNC: 99 MMOL/L (ref 98–107)
CO2 SERPL-SCNC: 25 MMOL/L (ref 20–31)
CREAT SERPL-MCNC: 0.94 MG/DL (ref 0.7–1.3)
EST. AVERAGE GLUCOSE BLD GHB EST-MCNC: 154 MG/DL (ref 60–110)
GFR SERPL CREATININE-BSD FRML MDRD: > 60 ML/MIN/{1.73_M2} (ref 49–?)
GLUCOSE SERPL-MCNC: 140 MG/DL (ref 74–106)
POTASSIUM SERPL-SCNC: 3.6 MMOL/L (ref 3.5–5.1)
SODIUM SERPL-SCNC: 134 MMOL/L (ref 136–145)
T4 SERPL-MCNC: 8.5 UG/DL (ref 4.5–10.9)
TSH SERPL DL<=0.005 MIU/L-ACNC: 2.54 UIU/ML (ref 0.55–4.78)